# Patient Record
Sex: FEMALE | Race: WHITE | NOT HISPANIC OR LATINO | Employment: OTHER | URBAN - METROPOLITAN AREA
[De-identification: names, ages, dates, MRNs, and addresses within clinical notes are randomized per-mention and may not be internally consistent; named-entity substitution may affect disease eponyms.]

---

## 2021-06-22 DIAGNOSIS — Z11.59 ENCOUNTER FOR SCREENING FOR OTHER VIRAL DISEASES: ICD-10-CM

## 2021-06-30 ENCOUNTER — MEDICAL CORRESPONDENCE (OUTPATIENT)
Dept: HEALTH INFORMATION MANAGEMENT | Facility: CLINIC | Age: 76
End: 2021-06-30

## 2021-08-03 ENCOUNTER — HOSPITAL ENCOUNTER (OUTPATIENT)
Dept: WOUND CARE | Facility: CLINIC | Age: 76
End: 2021-08-03
Attending: UROLOGY
Payer: MEDICARE

## 2021-08-03 PROCEDURE — 999N000196 HC STATISTIC WOC PT EDUCATION, > 60 MIN

## 2021-08-03 NOTE — PROGRESS NOTES
"[facility] Nurse Ostomy Marking and Education         Data:     History:    History of bladder CA. Pt lost  1 year ago this August. Pt upset she will be in hospital on Anniversary of husbands passing. Pt has no children.       Pt referred by Dr. Cai     Who is present for marking and education: Pt and a friend    Type of ostomy surgery:  Cystectomy, hysterectomy, Bilaterla S&O with Ileal conduit    Abdomen:  Rounded and soft. Pt has deep crease @ level of umbilicus.  Small rounded lower panus that fall slightly when pt stands. Superior panus noted and rolls into umbilical area crease. No old scars noted.            Intervention:       Patient's chart evaluated.      Assessments done today:  Belt line, previous abdominal surgeries and scars, and abdominal muscles.  Pt observed lying, sitting and standing.     Education:  Reviewed upcoming surgery, stoma construction, post-op expectations, and general Ileal conduit  Cares/concerns including: diet, bathing, odor, expected emptying, activity, clothing, night drainage; supplies     All patien questions answered.    Patient marked with \"x\" using surgical marker then allowed to dry 3 minutes and sealed with 3M Cavilon No Sting barrier film.  Pt marked apex of small small abdominal bulge that allows for adequate visualization and surrounding pouching surface           Assessment:      Pt is ready for OR          Plan:       Plan:   ? Surgery scheduled for:  8-11-21 @ 8am  ? Education and sample appliance given to pt for review.   ? Pt has [facility] contact information and will call for any ?/concerns    Will plan to follow patient post operatively     Margarita Maya [facility] RN        "

## 2021-08-05 RX ORDER — CEFAZOLIN SODIUM 2 G/100ML
2 INJECTION, SOLUTION INTRAVENOUS
Status: CANCELLED | OUTPATIENT
Start: 2021-08-11

## 2021-08-09 ENCOUNTER — LAB (OUTPATIENT)
Dept: URGENT CARE | Facility: URGENT CARE | Age: 76
End: 2021-08-09
Attending: UROLOGY
Payer: MEDICARE

## 2021-08-09 DIAGNOSIS — Z11.59 ENCOUNTER FOR SCREENING FOR OTHER VIRAL DISEASES: ICD-10-CM

## 2021-08-09 LAB — SARS-COV-2 RNA RESP QL NAA+PROBE: NEGATIVE

## 2021-08-09 PROCEDURE — U0005 INFEC AGEN DETEC AMPLI PROBE: HCPCS

## 2021-08-09 PROCEDURE — U0003 INFECTIOUS AGENT DETECTION BY NUCLEIC ACID (DNA OR RNA); SEVERE ACUTE RESPIRATORY SYNDROME CORONAVIRUS 2 (SARS-COV-2) (CORONAVIRUS DISEASE [COVID-19]), AMPLIFIED PROBE TECHNIQUE, MAKING USE OF HIGH THROUGHPUT TECHNOLOGIES AS DESCRIBED BY CMS-2020-01-R: HCPCS

## 2021-08-10 ENCOUNTER — ANESTHESIA EVENT (OUTPATIENT)
Dept: SURGERY | Facility: CLINIC | Age: 76
DRG: 654 | End: 2021-08-10
Payer: MEDICARE

## 2021-08-10 RX ORDER — MULTIPLE VITAMINS W/ MINERALS TAB 9MG-400MCG
1 TAB ORAL EVERY MORNING
COMMUNITY

## 2021-08-10 RX ORDER — BRIMONIDINE TARTRATE 1.5 MG/ML
1 SOLUTION/ DROPS OPHTHALMIC EVERY MORNING
COMMUNITY

## 2021-08-10 RX ORDER — LATANOPROST 50 UG/ML
1 SOLUTION/ DROPS OPHTHALMIC EVERY EVENING
COMMUNITY

## 2021-08-10 RX ORDER — CARBOXYMETHYLCELLULOSE SODIUM 5 MG/ML
1 SOLUTION/ DROPS OPHTHALMIC 3 TIMES DAILY PRN
COMMUNITY

## 2021-08-10 ASSESSMENT — LIFESTYLE VARIABLES: TOBACCO_USE: 1

## 2021-08-10 ASSESSMENT — COPD QUESTIONNAIRES
CAT_SEVERITY: MILD
COPD: 1

## 2021-08-10 NOTE — PROGRESS NOTES
"PTA medications updated by Medication Scribe prior to surgery via phone call with patient (last doses completed by Nurse)     Medication history sources: Patient, Surescripts, H&P and Patient's home med list  In the past week, patient estimated taking medication this percent of the time: Greater than 90%  Adherence assessment: N/A Not Observed    Significant changes made to the medication list:  None      Additional medication history information:   Patient brought own home meds: Latanoprost eye drsop, Brimonidine eye drops, Refresh eye drops    Medication reconciliation completed by provider prior to medication history? No    Time spent in this activity: 20 minutes    The information provided in this note is only as accurate as the sources available at the time of update(s)      Prior to Admission medications    Medication Sig Last Dose Taking? Auth Provider   brimonidine (ALPHAGAN-P) 0.15 % ophthalmic solution Place 1 drop into both eyes every morning 8/10/2021 at am Yes Reported, Patient   Calcium-Magnesium-Vitamin D (CALCIUM MAGNESIUM PO) Take 3-4 tablets by mouth 2 times daily \"Bone Health Packets\" > 1 week Yes Reported, Patient   carboxymethylcellulose PF (REFRESH PLUS) 0.5 % ophthalmic solution Place 1 drop into both eyes 3 times daily as needed for dry eyes  at prn Yes Reported, Patient   Fiber POWD Take 1 Dose by mouth daily Mixed with water > 1 week Yes Reported, Patient   latanoprost (XALATAN) 0.005 % ophthalmic solution Place 1 drop into both eyes every evening  at pm Yes Reported, Patient   Multiple Vitamins-Minerals (ZINC PO) Take 1 tablet by mouth every morning \"Zinc + Holy Basil\" > 1 week Yes Reported, Patient   multivitamin w/minerals (THERA-VIT-M) tablet Take 1 tablet by mouth every morning > 1 week Yes Reported, Patient   Probiotic Product (PROBIOTIC PO) Take 1 Dose by mouth daily Mixed in water > 1 week Yes Reported, Patient   TURMERIC PO Take 1 tablet by mouth every evening > 1 week Yes " Reported, Patient

## 2021-08-11 ENCOUNTER — HOSPITAL ENCOUNTER (INPATIENT)
Facility: CLINIC | Age: 76
LOS: 8 days | Discharge: NURSING, REHAB OR LONG-TERM CARE | DRG: 654 | End: 2021-08-19
Attending: UROLOGY | Admitting: UROLOGY
Payer: MEDICARE

## 2021-08-11 ENCOUNTER — ANESTHESIA (OUTPATIENT)
Dept: SURGERY | Facility: CLINIC | Age: 76
DRG: 654 | End: 2021-08-11
Payer: MEDICARE

## 2021-08-11 DIAGNOSIS — C67.9 MALIGNANT NEOPLASM OF URINARY BLADDER, UNSPECIFIED SITE (H): Primary | ICD-10-CM

## 2021-08-11 DIAGNOSIS — I10 BENIGN ESSENTIAL HYPERTENSION: ICD-10-CM

## 2021-08-11 LAB
ABO/RH(D): NORMAL
ANTIBODY SCREEN: NEGATIVE
HGB BLD-MCNC: 13.9 G/DL (ref 11.7–15.7)
POTASSIUM BLD-SCNC: 4.5 MMOL/L (ref 3.4–5.3)
SPECIMEN EXPIRATION DATE: NORMAL

## 2021-08-11 PROCEDURE — 250N000025 HC SEVOFLURANE, PER MIN: Performed by: UROLOGY

## 2021-08-11 PROCEDURE — 250N000009 HC RX 250: Performed by: NURSE ANESTHETIST, CERTIFIED REGISTERED

## 2021-08-11 PROCEDURE — 360N000078 HC SURGERY LEVEL 5, PER MIN: Performed by: UROLOGY

## 2021-08-11 PROCEDURE — 258N000003 HC RX IP 258 OP 636: Performed by: NURSE ANESTHETIST, CERTIFIED REGISTERED

## 2021-08-11 PROCEDURE — 258N000003 HC RX IP 258 OP 636: Performed by: ANESTHESIOLOGY

## 2021-08-11 PROCEDURE — 710N000009 HC RECOVERY PHASE 1, LEVEL 1, PER MIN: Performed by: UROLOGY

## 2021-08-11 PROCEDURE — 258N000003 HC RX IP 258 OP 636: Performed by: PHYSICIAN ASSISTANT

## 2021-08-11 PROCEDURE — 0TB60ZX EXCISION OF RIGHT URETER, OPEN APPROACH, DIAGNOSTIC: ICD-10-PCS | Performed by: UROLOGY

## 2021-08-11 PROCEDURE — 250N000011 HC RX IP 250 OP 636: Performed by: NURSE ANESTHETIST, CERTIFIED REGISTERED

## 2021-08-11 PROCEDURE — 0UT70ZZ RESECTION OF BILATERAL FALLOPIAN TUBES, OPEN APPROACH: ICD-10-PCS | Performed by: UROLOGY

## 2021-08-11 PROCEDURE — 07BC0ZZ EXCISION OF PELVIS LYMPHATIC, OPEN APPROACH: ICD-10-PCS | Performed by: UROLOGY

## 2021-08-11 PROCEDURE — 250N000011 HC RX IP 250 OP 636: Performed by: ANESTHESIOLOGY

## 2021-08-11 PROCEDURE — 36415 COLL VENOUS BLD VENIPUNCTURE: CPT | Performed by: ANESTHESIOLOGY

## 2021-08-11 PROCEDURE — 250N000011 HC RX IP 250 OP 636: Performed by: PHYSICIAN ASSISTANT

## 2021-08-11 PROCEDURE — 0TB70ZX EXCISION OF LEFT URETER, OPEN APPROACH, DIAGNOSTIC: ICD-10-PCS | Performed by: UROLOGY

## 2021-08-11 PROCEDURE — 0UT20ZZ RESECTION OF BILATERAL OVARIES, OPEN APPROACH: ICD-10-PCS | Performed by: UROLOGY

## 2021-08-11 PROCEDURE — 0TTB0ZZ RESECTION OF BLADDER, OPEN APPROACH: ICD-10-PCS | Performed by: UROLOGY

## 2021-08-11 PROCEDURE — 88331 PATH CONSLTJ SURG 1 BLK 1SPC: CPT | Mod: TC | Performed by: UROLOGY

## 2021-08-11 PROCEDURE — 120N000001 HC R&B MED SURG/OB

## 2021-08-11 PROCEDURE — 272N000001 HC OR GENERAL SUPPLY STERILE: Performed by: UROLOGY

## 2021-08-11 PROCEDURE — 370N000017 HC ANESTHESIA TECHNICAL FEE, PER MIN: Performed by: UROLOGY

## 2021-08-11 PROCEDURE — P9041 ALBUMIN (HUMAN),5%, 50ML: HCPCS | Performed by: NURSE ANESTHETIST, CERTIFIED REGISTERED

## 2021-08-11 PROCEDURE — 84132 ASSAY OF SERUM POTASSIUM: CPT | Performed by: ANESTHESIOLOGY

## 2021-08-11 PROCEDURE — 250N000013 HC RX MED GY IP 250 OP 250 PS 637: Performed by: PHYSICIAN ASSISTANT

## 2021-08-11 PROCEDURE — 0DTJ0ZZ RESECTION OF APPENDIX, OPEN APPROACH: ICD-10-PCS | Performed by: UROLOGY

## 2021-08-11 PROCEDURE — 85018 HEMOGLOBIN: CPT | Performed by: ANESTHESIOLOGY

## 2021-08-11 PROCEDURE — 86900 BLOOD TYPING SEROLOGIC ABO: CPT | Performed by: PHYSICIAN ASSISTANT

## 2021-08-11 PROCEDURE — 0T1807C BYPASS BILATERAL URETERS TO ILEOCUTANEOUS WITH AUTOLOGOUS TISSUE SUBSTITUTE, OPEN APPROACH: ICD-10-PCS | Performed by: UROLOGY

## 2021-08-11 PROCEDURE — 999N000141 HC STATISTIC PRE-PROCEDURE NURSING ASSESSMENT: Performed by: UROLOGY

## 2021-08-11 PROCEDURE — 0TTD0ZZ RESECTION OF URETHRA, OPEN APPROACH: ICD-10-PCS | Performed by: UROLOGY

## 2021-08-11 PROCEDURE — 0UT90ZZ RESECTION OF UTERUS, OPEN APPROACH: ICD-10-PCS | Performed by: UROLOGY

## 2021-08-11 RX ORDER — PROPOFOL 10 MG/ML
INJECTION, EMULSION INTRAVENOUS CONTINUOUS PRN
Status: DISCONTINUED | OUTPATIENT
Start: 2021-08-11 | End: 2021-08-11

## 2021-08-11 RX ORDER — ACETAMINOPHEN 325 MG/1
975 TABLET ORAL EVERY 6 HOURS
Status: DISCONTINUED | OUTPATIENT
Start: 2021-08-11 | End: 2021-08-19 | Stop reason: HOSPADM

## 2021-08-11 RX ORDER — PROCHLORPERAZINE MALEATE 5 MG
5 TABLET ORAL EVERY 6 HOURS PRN
Status: DISCONTINUED | OUTPATIENT
Start: 2021-08-11 | End: 2021-08-19 | Stop reason: HOSPADM

## 2021-08-11 RX ORDER — ALVIMOPAN 12 MG/1
12 CAPSULE ORAL 2 TIMES DAILY
Status: DISCONTINUED | OUTPATIENT
Start: 2021-08-12 | End: 2021-08-15

## 2021-08-11 RX ORDER — SODIUM CHLORIDE, SODIUM LACTATE, POTASSIUM CHLORIDE, CALCIUM CHLORIDE 600; 310; 30; 20 MG/100ML; MG/100ML; MG/100ML; MG/100ML
INJECTION, SOLUTION INTRAVENOUS CONTINUOUS
Status: DISCONTINUED | OUTPATIENT
Start: 2021-08-11 | End: 2021-08-11 | Stop reason: HOSPADM

## 2021-08-11 RX ORDER — NALOXONE HYDROCHLORIDE 0.4 MG/ML
0.4 INJECTION, SOLUTION INTRAMUSCULAR; INTRAVENOUS; SUBCUTANEOUS
Status: DISCONTINUED | OUTPATIENT
Start: 2021-08-11 | End: 2021-08-19 | Stop reason: HOSPADM

## 2021-08-11 RX ORDER — SODIUM CHLORIDE, SODIUM LACTATE, POTASSIUM CHLORIDE, CALCIUM CHLORIDE 600; 310; 30; 20 MG/100ML; MG/100ML; MG/100ML; MG/100ML
INJECTION, SOLUTION INTRAVENOUS CONTINUOUS PRN
Status: DISCONTINUED | OUTPATIENT
Start: 2021-08-11 | End: 2021-08-11

## 2021-08-11 RX ORDER — FENTANYL CITRATE 0.05 MG/ML
50 INJECTION, SOLUTION INTRAMUSCULAR; INTRAVENOUS
Status: COMPLETED | OUTPATIENT
Start: 2021-08-11 | End: 2021-08-11

## 2021-08-11 RX ORDER — ONDANSETRON 2 MG/ML
INJECTION INTRAMUSCULAR; INTRAVENOUS PRN
Status: DISCONTINUED | OUTPATIENT
Start: 2021-08-11 | End: 2021-08-11

## 2021-08-11 RX ORDER — CARBOXYMETHYLCELLULOSE SODIUM 5 MG/ML
1 SOLUTION/ DROPS OPHTHALMIC 3 TIMES DAILY PRN
Status: DISCONTINUED | OUTPATIENT
Start: 2021-08-11 | End: 2021-08-19 | Stop reason: HOSPADM

## 2021-08-11 RX ORDER — DIPHENHYDRAMINE HCL 12.5MG/5ML
12.5 LIQUID (ML) ORAL EVERY 6 HOURS PRN
Status: DISCONTINUED | OUTPATIENT
Start: 2021-08-11 | End: 2021-08-19 | Stop reason: HOSPADM

## 2021-08-11 RX ORDER — OXYCODONE HYDROCHLORIDE 5 MG/1
5 TABLET ORAL EVERY 4 HOURS PRN
Status: DISCONTINUED | OUTPATIENT
Start: 2021-08-11 | End: 2021-08-15

## 2021-08-11 RX ORDER — HYDROMORPHONE HCL IN WATER/PF 6 MG/30 ML
.2-.4 PATIENT CONTROLLED ANALGESIA SYRINGE INTRAVENOUS EVERY 5 MIN PRN
Status: DISCONTINUED | OUTPATIENT
Start: 2021-08-11 | End: 2021-08-11 | Stop reason: HOSPADM

## 2021-08-11 RX ORDER — ALBUMIN HUMAN 5 %
INTRAVENOUS SOLUTION INTRAVENOUS PRN
Status: DISCONTINUED | OUTPATIENT
Start: 2021-08-11 | End: 2021-08-11

## 2021-08-11 RX ORDER — NALOXONE HYDROCHLORIDE 0.4 MG/ML
0.2 INJECTION, SOLUTION INTRAMUSCULAR; INTRAVENOUS; SUBCUTANEOUS
Status: DISCONTINUED | OUTPATIENT
Start: 2021-08-11 | End: 2021-08-19 | Stop reason: HOSPADM

## 2021-08-11 RX ORDER — ONDANSETRON 4 MG/1
4 TABLET, ORALLY DISINTEGRATING ORAL EVERY 30 MIN PRN
Status: DISCONTINUED | OUTPATIENT
Start: 2021-08-11 | End: 2021-08-11 | Stop reason: HOSPADM

## 2021-08-11 RX ORDER — FENTANYL CITRATE 50 UG/ML
INJECTION, SOLUTION INTRAMUSCULAR; INTRAVENOUS PRN
Status: DISCONTINUED | OUTPATIENT
Start: 2021-08-11 | End: 2021-08-11

## 2021-08-11 RX ORDER — BRIMONIDINE TARTRATE 1.5 MG/ML
1 SOLUTION/ DROPS OPHTHALMIC EVERY MORNING
Status: DISCONTINUED | OUTPATIENT
Start: 2021-08-12 | End: 2021-08-19 | Stop reason: HOSPADM

## 2021-08-11 RX ORDER — CEFAZOLIN SODIUM 1 G/3ML
1 INJECTION, POWDER, FOR SOLUTION INTRAMUSCULAR; INTRAVENOUS EVERY 8 HOURS
Status: COMPLETED | OUTPATIENT
Start: 2021-08-11 | End: 2021-08-13

## 2021-08-11 RX ORDER — NEOSTIGMINE METHYLSULFATE 1 MG/ML
VIAL (ML) INJECTION PRN
Status: DISCONTINUED | OUTPATIENT
Start: 2021-08-11 | End: 2021-08-11

## 2021-08-11 RX ORDER — LIDOCAINE HYDROCHLORIDE 20 MG/ML
INJECTION, SOLUTION INFILTRATION; PERINEURAL PRN
Status: DISCONTINUED | OUTPATIENT
Start: 2021-08-11 | End: 2021-08-11

## 2021-08-11 RX ORDER — PROPOFOL 10 MG/ML
INJECTION, EMULSION INTRAVENOUS PRN
Status: DISCONTINUED | OUTPATIENT
Start: 2021-08-11 | End: 2021-08-11

## 2021-08-11 RX ORDER — EPHEDRINE SULFATE 50 MG/ML
INJECTION, SOLUTION INTRAMUSCULAR; INTRAVENOUS; SUBCUTANEOUS PRN
Status: DISCONTINUED | OUTPATIENT
Start: 2021-08-11 | End: 2021-08-11

## 2021-08-11 RX ORDER — SODIUM CHLORIDE 9 MG/ML
INJECTION, SOLUTION INTRAVENOUS CONTINUOUS
Status: DISCONTINUED | OUTPATIENT
Start: 2021-08-11 | End: 2021-08-13

## 2021-08-11 RX ORDER — ONDANSETRON 4 MG/1
4 TABLET, ORALLY DISINTEGRATING ORAL EVERY 6 HOURS PRN
Status: DISCONTINUED | OUTPATIENT
Start: 2021-08-11 | End: 2021-08-19 | Stop reason: HOSPADM

## 2021-08-11 RX ORDER — DIPHENHYDRAMINE HYDROCHLORIDE 50 MG/ML
12.5 INJECTION INTRAMUSCULAR; INTRAVENOUS EVERY 6 HOURS PRN
Status: DISCONTINUED | OUTPATIENT
Start: 2021-08-11 | End: 2021-08-19 | Stop reason: HOSPADM

## 2021-08-11 RX ORDER — METOCLOPRAMIDE HYDROCHLORIDE 5 MG/ML
10 INJECTION INTRAMUSCULAR; INTRAVENOUS EVERY 6 HOURS
Status: DISCONTINUED | OUTPATIENT
Start: 2021-08-11 | End: 2021-08-19 | Stop reason: HOSPADM

## 2021-08-11 RX ORDER — NALBUPHINE HYDROCHLORIDE 10 MG/ML
2.5-5 INJECTION, SOLUTION INTRAMUSCULAR; INTRAVENOUS; SUBCUTANEOUS EVERY 6 HOURS PRN
Status: DISCONTINUED | OUTPATIENT
Start: 2021-08-11 | End: 2021-08-19 | Stop reason: HOSPADM

## 2021-08-11 RX ORDER — LIDOCAINE 40 MG/G
CREAM TOPICAL
Status: DISCONTINUED | OUTPATIENT
Start: 2021-08-11 | End: 2021-08-19 | Stop reason: HOSPADM

## 2021-08-11 RX ORDER — ALBUTEROL SULFATE 0.83 MG/ML
2.5 SOLUTION RESPIRATORY (INHALATION) EVERY 4 HOURS PRN
Status: DISCONTINUED | OUTPATIENT
Start: 2021-08-11 | End: 2021-08-11 | Stop reason: HOSPADM

## 2021-08-11 RX ORDER — ONDANSETRON 2 MG/ML
4 INJECTION INTRAMUSCULAR; INTRAVENOUS EVERY 30 MIN PRN
Status: DISCONTINUED | OUTPATIENT
Start: 2021-08-11 | End: 2021-08-11 | Stop reason: HOSPADM

## 2021-08-11 RX ORDER — LATANOPROST 50 UG/ML
1 SOLUTION/ DROPS OPHTHALMIC EVERY EVENING
Status: DISCONTINUED | OUTPATIENT
Start: 2021-08-11 | End: 2021-08-19 | Stop reason: HOSPADM

## 2021-08-11 RX ORDER — MEPERIDINE HYDROCHLORIDE 25 MG/ML
12.5 INJECTION INTRAMUSCULAR; INTRAVENOUS; SUBCUTANEOUS EVERY 5 MIN PRN
Status: DISCONTINUED | OUTPATIENT
Start: 2021-08-11 | End: 2021-08-11 | Stop reason: HOSPADM

## 2021-08-11 RX ORDER — GLYCOPYRROLATE 0.2 MG/ML
INJECTION, SOLUTION INTRAMUSCULAR; INTRAVENOUS PRN
Status: DISCONTINUED | OUTPATIENT
Start: 2021-08-11 | End: 2021-08-11

## 2021-08-11 RX ORDER — DEXAMETHASONE SODIUM PHOSPHATE 4 MG/ML
INJECTION, SOLUTION INTRA-ARTICULAR; INTRALESIONAL; INTRAMUSCULAR; INTRAVENOUS; SOFT TISSUE PRN
Status: DISCONTINUED | OUTPATIENT
Start: 2021-08-11 | End: 2021-08-11

## 2021-08-11 RX ORDER — FENTANYL CITRATE 0.05 MG/ML
25 INJECTION, SOLUTION INTRAMUSCULAR; INTRAVENOUS EVERY 5 MIN PRN
Status: DISCONTINUED | OUTPATIENT
Start: 2021-08-11 | End: 2021-08-11 | Stop reason: HOSPADM

## 2021-08-11 RX ORDER — CEFOXITIN 2 G/1
2 INJECTION, POWDER, FOR SOLUTION INTRAVENOUS
Status: COMPLETED | OUTPATIENT
Start: 2021-08-11 | End: 2021-08-11

## 2021-08-11 RX ORDER — BUPIVACAINE HYDROCHLORIDE AND EPINEPHRINE 2.5; 5 MG/ML; UG/ML
INJECTION, SOLUTION INFILTRATION; PERINEURAL PRN
Status: DISCONTINUED | OUTPATIENT
Start: 2021-08-11 | End: 2021-08-11

## 2021-08-11 RX ORDER — ALVIMOPAN 12 MG/1
12 CAPSULE ORAL ONCE
Status: COMPLETED | OUTPATIENT
Start: 2021-08-11 | End: 2021-08-11

## 2021-08-11 RX ORDER — ONDANSETRON 2 MG/ML
4 INJECTION INTRAMUSCULAR; INTRAVENOUS EVERY 6 HOURS PRN
Status: DISCONTINUED | OUTPATIENT
Start: 2021-08-11 | End: 2021-08-19 | Stop reason: HOSPADM

## 2021-08-11 RX ADMIN — METOCLOPRAMIDE HYDROCHLORIDE 10 MG: 5 INJECTION INTRAMUSCULAR; INTRAVENOUS at 23:31

## 2021-08-11 RX ADMIN — ALVIMOPAN 12 MG: 12 CAPSULE ORAL at 06:13

## 2021-08-11 RX ADMIN — NEOSTIGMINE METHYLSULFATE 4 MG: 1 INJECTION, SOLUTION INTRAVENOUS at 12:30

## 2021-08-11 RX ADMIN — ROCURONIUM BROMIDE 50 MG: 10 INJECTION INTRAVENOUS at 07:50

## 2021-08-11 RX ADMIN — CEFOXITIN SODIUM 2 G: 2 POWDER, FOR SOLUTION INTRAVENOUS at 11:41

## 2021-08-11 RX ADMIN — SODIUM CHLORIDE, POTASSIUM CHLORIDE, SODIUM LACTATE AND CALCIUM CHLORIDE: 600; 310; 30; 20 INJECTION, SOLUTION INTRAVENOUS at 10:31

## 2021-08-11 RX ADMIN — Medication 12 MCG: at 07:45

## 2021-08-11 RX ADMIN — ROCURONIUM BROMIDE 20 MG: 10 INJECTION INTRAVENOUS at 08:41

## 2021-08-11 RX ADMIN — GLYCOPYRROLATE 0.2 MG: 0.2 INJECTION, SOLUTION INTRAMUSCULAR; INTRAVENOUS at 08:39

## 2021-08-11 RX ADMIN — PHENYLEPHRINE HYDROCHLORIDE 100 MCG: 10 INJECTION INTRAVENOUS at 10:31

## 2021-08-11 RX ADMIN — FENTANYL CITRATE 50 MCG: 50 INJECTION, SOLUTION INTRAMUSCULAR; INTRAVENOUS at 07:55

## 2021-08-11 RX ADMIN — ROCURONIUM BROMIDE 10 MG: 10 INJECTION INTRAVENOUS at 10:05

## 2021-08-11 RX ADMIN — SODIUM CHLORIDE: 9 INJECTION, SOLUTION INTRAVENOUS at 16:14

## 2021-08-11 RX ADMIN — SODIUM CHLORIDE, POTASSIUM CHLORIDE, SODIUM LACTATE AND CALCIUM CHLORIDE: 600; 310; 30; 20 INJECTION, SOLUTION INTRAVENOUS at 08:00

## 2021-08-11 RX ADMIN — ROCURONIUM BROMIDE 20 MG: 10 INJECTION INTRAVENOUS at 10:39

## 2021-08-11 RX ADMIN — FENTANYL CITRATE 50 MCG: 50 INJECTION, SOLUTION INTRAMUSCULAR; INTRAVENOUS at 07:27

## 2021-08-11 RX ADMIN — METOCLOPRAMIDE HYDROCHLORIDE 10 MG: 5 INJECTION INTRAMUSCULAR; INTRAVENOUS at 18:17

## 2021-08-11 RX ADMIN — CEFAZOLIN 1 G: 1 INJECTION, POWDER, FOR SOLUTION INTRAMUSCULAR; INTRAVENOUS at 19:33

## 2021-08-11 RX ADMIN — Medication 5 MG: at 08:40

## 2021-08-11 RX ADMIN — Medication 250 ML: at 11:06

## 2021-08-11 RX ADMIN — BUPIVACAINE HYDROCHLORIDE AND EPINEPHRINE BITARTRATE 3 ML: 2.5; .005 INJECTION, SOLUTION EPIDURAL; INFILTRATION; INTRACAUDAL; PERINEURAL at 10:58

## 2021-08-11 RX ADMIN — DEXAMETHASONE SODIUM PHOSPHATE 4 MG: 4 INJECTION, SOLUTION INTRA-ARTICULAR; INTRALESIONAL; INTRAMUSCULAR; INTRAVENOUS; SOFT TISSUE at 07:50

## 2021-08-11 RX ADMIN — ONDANSETRON 4 MG: 2 INJECTION INTRAMUSCULAR; INTRAVENOUS at 12:16

## 2021-08-11 RX ADMIN — PROPOFOL 150 MG: 10 INJECTION, EMULSION INTRAVENOUS at 07:50

## 2021-08-11 RX ADMIN — HYDROMORPHONE HYDROCHLORIDE 0.5 MG: 1 INJECTION, SOLUTION INTRAMUSCULAR; INTRAVENOUS; SUBCUTANEOUS at 09:19

## 2021-08-11 RX ADMIN — ROCURONIUM BROMIDE 30 MG: 10 INJECTION INTRAVENOUS at 08:31

## 2021-08-11 RX ADMIN — PHENYLEPHRINE HYDROCHLORIDE 100 MCG: 10 INJECTION INTRAVENOUS at 08:20

## 2021-08-11 RX ADMIN — Medication 8 MCG: at 09:07

## 2021-08-11 RX ADMIN — CEFOXITIN SODIUM 2 G: 2 POWDER, FOR SOLUTION INTRAVENOUS at 09:48

## 2021-08-11 RX ADMIN — PHENYLEPHRINE HYDROCHLORIDE 150 MCG: 10 INJECTION INTRAVENOUS at 08:04

## 2021-08-11 RX ADMIN — ROCURONIUM BROMIDE 20 MG: 10 INJECTION INTRAVENOUS at 09:32

## 2021-08-11 RX ADMIN — CEFOXITIN SODIUM 2 G: 2 POWDER, FOR SOLUTION INTRAVENOUS at 07:55

## 2021-08-11 RX ADMIN — ROCURONIUM BROMIDE 5 MG: 10 INJECTION INTRAVENOUS at 12:04

## 2021-08-11 RX ADMIN — FENTANYL CITRATE: 0.05 INJECTION, SOLUTION INTRAMUSCULAR; INTRAVENOUS at 13:46

## 2021-08-11 RX ADMIN — PROPOFOL 30 MCG/KG/MIN: 10 INJECTION, EMULSION INTRAVENOUS at 07:55

## 2021-08-11 RX ADMIN — Medication 250 ML: at 10:11

## 2021-08-11 RX ADMIN — SODIUM CHLORIDE, POTASSIUM CHLORIDE, SODIUM LACTATE AND CALCIUM CHLORIDE: 600; 310; 30; 20 INJECTION, SOLUTION INTRAVENOUS at 07:37

## 2021-08-11 RX ADMIN — ROCURONIUM BROMIDE 10 MG: 10 INJECTION INTRAVENOUS at 11:19

## 2021-08-11 RX ADMIN — PHENYLEPHRINE HYDROCHLORIDE 100 MCG: 10 INJECTION INTRAVENOUS at 08:38

## 2021-08-11 RX ADMIN — LIDOCAINE HYDROCHLORIDE 70 MG: 20 INJECTION, SOLUTION INFILTRATION; PERINEURAL at 07:50

## 2021-08-11 RX ADMIN — BUPIVACAINE HYDROCHLORIDE AND EPINEPHRINE BITARTRATE 3 ML: 2.5; .005 INJECTION, SOLUTION EPIDURAL; INFILTRATION; INTRACAUDAL; PERINEURAL at 11:48

## 2021-08-11 RX ADMIN — MIDAZOLAM HYDROCHLORIDE 1 MG: 1 INJECTION, SOLUTION INTRAMUSCULAR; INTRAVENOUS at 07:26

## 2021-08-11 RX ADMIN — FENTANYL CITRATE 50 MCG: 50 INJECTION, SOLUTION INTRAMUSCULAR; INTRAVENOUS at 07:50

## 2021-08-11 RX ADMIN — PHENYLEPHRINE HYDROCHLORIDE 50 MCG: 10 INJECTION INTRAVENOUS at 08:05

## 2021-08-11 RX ADMIN — PHENYLEPHRINE HYDROCHLORIDE 0.25 MCG/KG/MIN: 10 INJECTION INTRAVENOUS at 08:24

## 2021-08-11 RX ADMIN — ONDANSETRON 4 MG: 2 INJECTION INTRAMUSCULAR; INTRAVENOUS at 13:05

## 2021-08-11 RX ADMIN — GLYCOPYRROLATE 0.6 MG: 0.2 INJECTION, SOLUTION INTRAMUSCULAR; INTRAVENOUS at 12:30

## 2021-08-11 RX ADMIN — LATANOPROST 1 DROP: 50 SOLUTION/ DROPS OPHTHALMIC at 20:43

## 2021-08-11 RX ADMIN — BUPIVACAINE HYDROCHLORIDE AND EPINEPHRINE BITARTRATE 2 ML: 2.5; .005 INJECTION, SOLUTION EPIDURAL; INFILTRATION; INTRACAUDAL; PERINEURAL at 12:52

## 2021-08-11 ASSESSMENT — ACTIVITIES OF DAILY LIVING (ADL)
ADLS_ACUITY_SCORE: 21
ADLS_ACUITY_SCORE: 15

## 2021-08-11 ASSESSMENT — ENCOUNTER SYMPTOMS
DYSRHYTHMIAS: 0
SEIZURES: 0

## 2021-08-11 ASSESSMENT — MIFFLIN-ST. JEOR: SCORE: 1265.6

## 2021-08-11 NOTE — PROCEDURES
OPERATIVE REPORT    SURGEON: Francis Cai MD   ASSISTANT: Leonela Acosta PA-C    PREOPERATIVE DIAGNOSIS: Muscle invasive bladder cancer.  POSTOPERATIVE DIAGNOSIS: Muscle invasive bladder cancer.     PROCEDURE PERFORMED:   1. Radical cystectomy  2. Hysterectomy with bilateral salpingooopherectomy   3. Bilateral pelvic lymphadenectomy  4. Creation of ileal conduit urinary diversion  5. Appendectomy       OPERATIVE INDICATION: Felicitas Victor is a 76 year old female who was 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 The patient did not receive neoadjuvant chemotherapy.  Staging was done with PET-CT  and was negative for metastatic disease.  After understanding various management options, she elected to undergo today's procedure described above.     Findings: No evidence of extravesical cancer. Firm bladder mass at dome.  Bladder mobile. Lymph nodes not enlarged.     DESCRIPTION OF PROCEDURE : After properly identifying the patient and verifying informed consent, she was brought to the operating room in stable condition. After sufficient induction of general anesthetic, she was placed in a frog-legged supine position with all pressure points well padded. She was then prepped and draped in the usual manner. A Govea catheter was placed within the sterile field.  A betadine soaked sponge stick was placed in the vagina.    A midline incision was made from the umbilicus to the symphysis pubis. The incision was carried thru the layers and the abdominal cavity was entered. Retraction was maintained using the Bookwalter retractor. The dissection was started on the right side.  I then identified the right ovarian vessels and the right ureter. The ovarian vessels were doubly clamped and transected. The right ureter was than dissected all the way to the bladder where it was divided between clips.  The broad ligament was incised and the round ligament was cauterized and transected. The ovary and the fallopian  tube were freed. The bladder was than dissected away from the right pelvic wall all the way to the endopelvic fascia. The same procedure was than performed on the left. The peritoneum at the cul-de-sac between the bladder and the uterus was incised.  This exposed the lateral bladder pedicles. Now using the Ligasure the bladder pedicles were cauterized and transected. Posteriorly the anterior vaginal wall was opened and incorporated along with the trigone. The bladder was dissected all the way to the urethra. Next I mobilized the bladder anteriorly.  The urethra was identified and dissected. I incised the anterior vaginal wall circumferentially to complete the hysterectomy and the uterus, bilateral ovaries and tubes, bladder, and cuff of anterior vaginal wall and the urethra was completely freed up and sent to pathology.  The urethra was transected at the meatus and the distal opening closed with a running suture of 2-0 vicryl. Frozen section from the distal urethral margin was sent.  Both the distal ureteral margins and the urethral margin returned negative for malignancy.     The vaginal wall was closed in 2 layers with 2-0 vicryl suture.         Pelvic Lymphadenectomy:  A bilateral pelvic lymphadenectomy was then performed. The lymphatic tissue around the external iliac vessels, obturator fossa and internal iliac vessels was taken to the common iliac bilaterally. The lateral margin of the dissection was the genitofemoral nerve. The obturator nerve was identified and preserved bilaterally.     Appendectomy: The appendix was next identified. It was dissected and appendectomy performed by placing NISHI stapler at the base of the appendix. This was sent to pathology for routine analysis.      We then proceeded to create an ileal conduit. A 15-cm segment of distal ileum was identified on a broad mesenteric pedicle. Windows in the mesentery were created with cautery and divided with the Ligasure. This segment of ileum was  isolated with a NISHI 80 stapler. Bowel continuity was achieved by performing a side-to-side stapled anastomosis with the NISHI and TA staplers. The isolated bowel segment was irrigated free of bowel contents with antibiotic solution. The left ureter was gently mobilized and brought medially through a window in the sigmoid mesentery. The right ureter was also gently mobilized. Both ureters were anastomosed to the proximal end of the conduit after spatulating their distal ends. This was done with interrupted 4-0 Vicryl stitches. Both ureters were stented with an 8-[place] pediatric feeding tube, which was secured to the ureters with a 5-0 chromic stitch to prevent stent migration.     At the marked RUQ site, a 2-cm [place] of skin was excised, and a cruciate incision was made in the rectus fascia. A 2-0 Vicryl stitch was placed in the posterior fascia, and four 2-0 Vicryl stitches were placed in the anterior rectus fascia. The conduit was then gently brought to the ostomy opening. It was secured to the fascia with the preplaced Vicryl stitches.  The stoma was then everted and matured using 3-0 chromic stitches. A nice healthy lety bud stoma was obtained. The window in the small bowel mesentery was then closed with several 3-0 Vicryl stitches, and the peritoneum was closed over the conduit, effectively retroperitonealizing the conduit.    The wound was irrigated and inspected, and hemostasis was found to be excellent. Rosita powder was applied to enhance hemostasis. The abdomen was then closed with a running looped #1 looped PDS reinfoirced with several figure-of-eight PDS stitches. A 15-[place] round Curry-Frazier drain was placed through the left lower quadrant to drain the pelvis and a second drain in the RLQ was anterior to the fascia to drain the subcutaneous space . These were secured to the skin with a drain stitch. The skin was closed with staples.  EBL was 150 mL. There were no apparent complications. All needle,  sponge, lap and instrument counts were correct. At this point, the patient was awoken from anesthesia and brought to recovery room in stable condition.

## 2021-08-11 NOTE — ANESTHESIA POSTPROCEDURE EVALUATION
Patient: Felicitas Victor    Procedure(s):  ANTERIOR PELVIC EXENTERATION, BILATERAL PELVIC LYMPH NODE DISSECTION  ILEAL CONDUIT URINARY DIVERSION,  APPENDECTOMY    Diagnosis:Bladder cancer (H) [C67.9]  Diagnosis Additional Information: No value filed.    Anesthesia Type:  General    Note:     Postop Pain Control: Uneventful            Sign Out: Well controlled pain   PONV: No   Neuro/Psych: Uneventful            Sign Out: Acceptable/Baseline neuro status   Airway/Respiratory: Uneventful            Sign Out: Acceptable/Baseline resp. status   CV/Hemodynamics: Uneventful            Sign Out: Acceptable CV status; No obvious hypovolemia; No obvious fluid overload   Other NRE: NONE   DID A NON-ROUTINE EVENT OCCUR? No           Last vitals:  Vitals Value Taken Time   BP 91/78 08/11/21 1559   Temp 36.8  C (98.3  F) 08/11/21 1540   Pulse 95 08/11/21 1559   Resp 17 08/11/21 1541   SpO2 98 % 08/11/21 1542   Vitals shown include unvalidated device data.    Electronically Signed By: Corbin Rodríguez MD  August 11, 2021  4:54 PM

## 2021-08-11 NOTE — ANESTHESIA CARE TRANSFER NOTE
Patient: Felicitas Victor    Procedure(s):  ANTERIOR PELVIC EXENTERATION, BILATERAL PELVIC LYMPH NODE DISSECTION  ILEAL CONDUIT URINARY DIVERSION,  APPENDECTOMY    Diagnosis: Bladder cancer (H) [C67.9]  Diagnosis Additional Information: No value filed.    Anesthesia Type:   General     Note:    Oropharynx: oropharynx clear of all foreign objects and spontaneously breathing  Level of Consciousness: awake and drowsy  Oxygen Supplementation: room air    Independent Airway: airway patency satisfactory and stable  Dentition: dentition unchanged      Patient transferred to: PACU  Comments: Neuromuscular blockade reversed after TOF 4/4, spontaneous respirations, adequate tidal volumes, followed commands to voice, oropharynx suctioned with soft flexible catheter, extubated atraumatically, extubated with suction, airway patent after extubation.  Oxygen via  at  to PACU. .     Handoff Report: Identifed the Patient, Identified the Reponsible Provider, Reviewed the pertinent medical history, Discussed the surgical course, Reviewed Intra-OP anesthesia mangement and issues during anesthesia, Set expectations for post-procedure period and Allowed opportunity for questions and acknowledgement of understanding      Vitals:  Vitals Value Taken Time   BP     Temp     Pulse 60 08/11/21 1323   Resp 19 08/11/21 1323   SpO2 95 % 08/11/21 1323   Vitals shown include unvalidated device data.    Electronically Signed By: KHADIJAH Tello CRNA  August 11, 2021  1:24 PM

## 2021-08-11 NOTE — ANESTHESIA PREPROCEDURE EVALUATION
Anesthesia Pre-Procedure Evaluation    Patient: Felicitas Victor   MRN: [id number] : 1945        Preoperative Diagnosis: Bladder cancer (H) [C67.9]   Procedure : Procedure(s):  ANTERIOR PELVIC EXENTERATION  ILEAL CONDUIT URINARY DIVERSION,  POSSIBLE APPENDECTOMY     Past Medical History:   Diagnosis Date     Astigmatism with presbyopia      Bilateral low back pain      Bladder mass      Cataract, nuclear sclerotic, both eyes      Diverticulosis      Endometrial hyperplasia      Hypertension      Mild emphysema (H)      Osteopenia      Primary open angle glaucoma      Pulmonary nodule       Past Surgical History:   Procedure Laterality Date     ENT SURGERY       GENITOURINARY SURGERY        No Known Allergies   Social History     Tobacco Use     Smoking status: Former Smoker   Substance Use Topics     Alcohol use: Not on file      Wt Readings from Last 1 Encounters:   No data found for Wt        Anesthesia Evaluation            ROS/MED HX  ENT/Pulmonary:     (+) tobacco use, Past use, mild,  COPD,  (-) asthma and sleep apnea   Neurologic:    (-) no seizures, no CVA and no TIA   Cardiovascular:     (+) hypertension-----Previous cardiac testing   Echo: Date: Results:    Stress Test: Date: Results:    ECG Reviewed: Date:  Results:  SB at 58 bpm  Cath: Date: Results:   (-) CAD, CHF and arrhythmias   METS/Exercise Tolerance: >4 METS    Hematologic:       Musculoskeletal:       GI/Hepatic:    (-) GERD and liver disease   Renal/Genitourinary:    (-) renal disease   Endo:    (-) Type I DM and Type II DM   Psychiatric/Substance Use:       Infectious Disease:       Malignancy:   (+) Malignancy, History of Other.Other CA Bladder status post.    Other: Comment: Open angle glaucoma           Physical Exam    Airway        Mallampati: III   TM distance: > 3 FB   Neck ROM: full   Mouth opening: < 3 cm    Respiratory Devices and Support         Dental       (+) upper dentures and missing      Cardiovascular          Rhythm  and rate: regular     Pulmonary           breath sounds clear to auscultation           OUTSIDE LABS:  CBC: No results found for: WBC, HGB, HCT, PLT  BMP: No results found for: NA, POTASSIUM, CHLORIDE, CO2, BUN, CR, GLC  COAGS: No results found for: PTT, INR, FIBR  POC: No results found for: BGM, HCG, HCGS  HEPATIC: No results found for: ALBUMIN, PROTTOTAL, ALT, AST, GGT, ALKPHOS, BILITOTAL, BILIDIRECT, TIANA  OTHER: No results found for: PH, LACT, A1C, ELI, PHOS, MAG, LIPASE, AMYLASE, TSH, T4, T3, CRP, SED    Anesthesia Plan    ASA Status:  2      Anesthesia Type: General.     - Airway: ETT   Induction: Intravenous, Propofol.   Maintenance: Balanced.   Techniques and Equipment:     - Airway: Video-Laryngoscope     - Lines/Monitors: 2nd IV, Arterial Line     Consents    Anesthesia Plan(s) and associated risks, benefits, and realistic alternatives discussed. Questions answered and patient/representative(s) expressed understanding.     - Discussed with:  Patient         Postoperative Care    Pain management: Postop Epidural, Multi-modal analgesia.   PONV prophylaxis: Ondansetron (or other 5HT-3), Dexamethasone or Solumedrol, Background Propofol Infusion     Comments:                Corbin Rodríguez MD

## 2021-08-11 NOTE — ANESTHESIA PROCEDURE NOTES
Epidural catheter Procedure Note  Pre-Procedure   Staff -        Anesthesiologist:  Corbin Rodríguez MD       Performed By: Anesthesiologist       Location: pre-op       Pre-Anesthestic Checklist: patient identified, risks and benefits discussed, informed consent, monitors and equipment checked, pre-op evaluation, at physician/surgeon's request and post-op pain management  Timeout:       Correct Patient: Yes        Correct Procedure: Yes        Correct Site: Yes        Correct Position: Yes   Procedure Documentation  Procedure: epidural catheter       Patient Position: sitting       Skin prep: Chloraprep      Local skin infiltrated with mL of 1% lidocaine.        Insertion Site: T10-11. (midline approach).       Technique: LORT saline        Needle Type: Touhy needle       Needle Gauge: 17.        Needle Length (Inches): 5        Catheter: 18 G.         Catheter threaded easily.         # of attempts: 1 and  # of redirects:  1    Assessment/Narrative         Paresthesias: No.      Test dose of 3 mL at 07:19 CDT.         Test dose negative, 3 minutes after injection, for signs of intravascular, subdural, or intrathecal injection.       Insertion/Infusion Method: LORT saline       Aspiration negative for Heme or CSF via Epidural Catheter.    Comments:  Risks, benefits, alternatives of epidural analgesia discussed with the patient who agrees to proceed.  After a procedural timeout confirming the correct patient and details of the procedure, 1% lidocaine was injected superficially over the skin for topical anesthesia.  A 17 G Tuohy needle was advanced at the above lumbar interspace until contact was felt with ligamentum flavum.  Loss of resistance to saline was encountered at above noted depth.  3 ml of saline was injected to expand the epidural space.  The epidural catheter was then easily threaded to the depth noted above.  Paresthesias were as noted above.Ropivacaine 10 ml bolus via epidural catheter administered  at end of procedure.  Patient tolerated well.

## 2021-08-11 NOTE — ANESTHESIA PROCEDURE NOTES
Arterial Line Procedure Note  Pre-Procedure   Staff -        Anesthesiologist:  Corbin Rodríguez MD       Performed By: Anesthesiologist       Location: pre-op       Pre-Anesthestic Checklist: patient identified, IV checked, site marked, risks and benefits discussed, informed consent, monitors and equipment checked and pre-op evaluation  Timeout:       Correct Patient: Yes        Correct Procedure: Yes        Correct Site: Yes        Correct Position: Yes   Procedure   Procedure: arterial line       Laterality: right       Insertion Site: radial.  Sterile Prep        All elements of maximal sterile barrier technique followed       Skin prep: Chloraprep  Insertion/Injection        Technique: Seldinger Technique        Catheter Type/Size: 20 gauge, 1.75 in/4.5 cm quick cath (integral wire)  Narrative        Tegaderm dressing used.     Arterial waveform: Yes   Comments:  Site sterilely prepped with Chloraprep.  Lidocaine 1% used for skin topicalization.  Right radial artery palpated.  Arrow catheter advanced into radial artery, with return of bright red blood.  Pulsatile waveform on monitor.  Patient tolerate procedure well.

## 2021-08-11 NOTE — PLAN OF CARE
A/Ox4. VSS ex soft Bps. 1L NC, sating well. CAPNO is WNL. Npo. Denies pain, N/V, or SOB. Fentanyl and Bupivacaine epidural infusion running at 8 ml/hr. Epidural injection site is CDI, covered w/ clear dressing. NS running at 100 ml/hr. Right urostomy connected to a maldonado bag. Urine is pink tinged. Right and left ABDIRIZAK drains in place, left ABDIRIZAK is a pelvic drain. Abdominal midline incision dressing has no new drainage and is CDI.

## 2021-08-11 NOTE — PROGRESS NOTES
[facility]  [facility] OR  PRE-SURGICAL VISIT    Fr Corbin pre-surgical visit with patient.  Provided spiritual support and Sacrament of Anointing.    [facility]   [phone] (pager)  [phone] (office)

## 2021-08-11 NOTE — ANESTHESIA PROCEDURE NOTES
Airway       Patient location during procedure: OR       Procedure Start/Stop Times: 8/11/2021 7:52 AM  Staff -        CRNA: Domitila Lea APRN CRNA       Performed By: CRNA  Consent for Airway        Urgency: elective  Indications and Patient Condition       Indications for airway management: reyes-procedural       Induction type:intravenous       Mask difficulty assessment: 2 - vent by mask + OA or adjuvant +/- NMBA    Final Airway Details       Final airway type: endotracheal airway       Successful airway: ETT - single  Endotracheal Airway Details        ETT size (mm): 7.0       Cuffed: yes       Successful intubation technique: video laryngoscopy       VL Blade Size: Glidescope 3       Grade View of Cords: 1       Adjucts: stylet       Position: Right       Measured from: lips       Secured at (cm): 21       Bite block used: None    Post intubation assessment        Placement verified by: capnometry, equal breath sounds and chest rise        Number of attempts at approach: 1       Secured with: pink tape       Ease of procedure: easy       Dentition: Unchanged

## 2021-08-12 LAB
ANION GAP SERPL CALCULATED.3IONS-SCNC: 3 MMOL/L (ref 3–14)
BUN SERPL-MCNC: 23 MG/DL (ref 7–30)
CALCIUM SERPL-MCNC: 8.1 MG/DL (ref 8.5–10.1)
CHLORIDE BLD-SCNC: 112 MMOL/L (ref 94–109)
CO2 SERPL-SCNC: 26 MMOL/L (ref 20–32)
CREAT SERPL-MCNC: 1.1 MG/DL (ref 0.52–1.04)
ERYTHROCYTE [DISTWIDTH] IN BLOOD BY AUTOMATED COUNT: 14 % (ref 10–15)
GFR SERPL CREATININE-BSD FRML MDRD: 48 ML/MIN/1.73M2
GLUCOSE BLD-MCNC: 113 MG/DL (ref 70–99)
HCT VFR BLD AUTO: 35.9 % (ref 35–47)
HGB BLD-MCNC: 11.2 G/DL (ref 11.7–15.7)
MAGNESIUM SERPL-MCNC: 2.5 MG/DL (ref 1.6–2.3)
MCH RBC QN AUTO: 30.1 PG (ref 26.5–33)
MCHC RBC AUTO-ENTMCNC: 31.2 G/DL (ref 31.5–36.5)
MCV RBC AUTO: 97 FL (ref 78–100)
PHOSPHATE SERPL-MCNC: 3.8 MG/DL (ref 2.5–4.5)
PLATELET # BLD AUTO: 253 10E3/UL (ref 150–450)
POTASSIUM BLD-SCNC: 4.5 MMOL/L (ref 3.4–5.3)
RBC # BLD AUTO: 3.72 10E6/UL (ref 3.8–5.2)
SODIUM SERPL-SCNC: 141 MMOL/L (ref 133–144)
WBC # BLD AUTO: 9.2 10E3/UL (ref 4–11)

## 2021-08-12 PROCEDURE — 250N000013 HC RX MED GY IP 250 OP 250 PS 637: Performed by: PHYSICIAN ASSISTANT

## 2021-08-12 PROCEDURE — 83735 ASSAY OF MAGNESIUM: CPT | Performed by: PHYSICIAN ASSISTANT

## 2021-08-12 PROCEDURE — 250N000011 HC RX IP 250 OP 636: Performed by: PHYSICIAN ASSISTANT

## 2021-08-12 PROCEDURE — 80048 BASIC METABOLIC PNL TOTAL CA: CPT | Performed by: PHYSICIAN ASSISTANT

## 2021-08-12 PROCEDURE — 120N000001 HC R&B MED SURG/OB

## 2021-08-12 PROCEDURE — 84100 ASSAY OF PHOSPHORUS: CPT | Performed by: PHYSICIAN ASSISTANT

## 2021-08-12 PROCEDURE — C9113 INJ PANTOPRAZOLE SODIUM, VIA: HCPCS | Performed by: PHYSICIAN ASSISTANT

## 2021-08-12 PROCEDURE — 85027 COMPLETE CBC AUTOMATED: CPT | Performed by: PHYSICIAN ASSISTANT

## 2021-08-12 PROCEDURE — 258N000003 HC RX IP 258 OP 636: Performed by: PHYSICIAN ASSISTANT

## 2021-08-12 PROCEDURE — 36415 COLL VENOUS BLD VENIPUNCTURE: CPT | Performed by: PHYSICIAN ASSISTANT

## 2021-08-12 PROCEDURE — 258N000003 HC RX IP 258 OP 636: Performed by: ANESTHESIOLOGY

## 2021-08-12 PROCEDURE — 250N000011 HC RX IP 250 OP 636: Performed by: ANESTHESIOLOGY

## 2021-08-12 PROCEDURE — 999N000198 HC STATISTIC WOC PT EDUCATION, 16-30 MIN

## 2021-08-12 RX ADMIN — CEFAZOLIN 1 G: 1 INJECTION, POWDER, FOR SOLUTION INTRAMUSCULAR; INTRAVENOUS at 19:46

## 2021-08-12 RX ADMIN — METOCLOPRAMIDE HYDROCHLORIDE 10 MG: 5 INJECTION INTRAMUSCULAR; INTRAVENOUS at 05:26

## 2021-08-12 RX ADMIN — CEFAZOLIN 1 G: 1 INJECTION, POWDER, FOR SOLUTION INTRAMUSCULAR; INTRAVENOUS at 03:06

## 2021-08-12 RX ADMIN — LATANOPROST 1 DROP: 50 SOLUTION/ DROPS OPHTHALMIC at 21:12

## 2021-08-12 RX ADMIN — ACETAMINOPHEN 975 MG: 325 TABLET, FILM COATED ORAL at 05:26

## 2021-08-12 RX ADMIN — ACETAMINOPHEN 975 MG: 325 TABLET, FILM COATED ORAL at 17:43

## 2021-08-12 RX ADMIN — BRIMONIDINE TARTRATE 1 DROP: 1.5 SOLUTION/ DROPS OPHTHALMIC at 08:27

## 2021-08-12 RX ADMIN — ACETAMINOPHEN 975 MG: 325 TABLET, FILM COATED ORAL at 11:15

## 2021-08-12 RX ADMIN — METOCLOPRAMIDE HYDROCHLORIDE 10 MG: 5 INJECTION INTRAMUSCULAR; INTRAVENOUS at 11:15

## 2021-08-12 RX ADMIN — SODIUM CHLORIDE: 9 INJECTION, SOLUTION INTRAVENOUS at 13:15

## 2021-08-12 RX ADMIN — ALVIMOPAN 12 MG: 12 CAPSULE ORAL at 21:12

## 2021-08-12 RX ADMIN — ACETAMINOPHEN 975 MG: 325 TABLET, FILM COATED ORAL at 23:33

## 2021-08-12 RX ADMIN — ALVIMOPAN 12 MG: 12 CAPSULE ORAL at 08:25

## 2021-08-12 RX ADMIN — PANTOPRAZOLE SODIUM 40 MG: 40 INJECTION, POWDER, FOR SOLUTION INTRAVENOUS at 08:25

## 2021-08-12 RX ADMIN — SODIUM CHLORIDE: 9 INJECTION, SOLUTION INTRAVENOUS at 23:33

## 2021-08-12 RX ADMIN — METOCLOPRAMIDE HYDROCHLORIDE 10 MG: 5 INJECTION INTRAMUSCULAR; INTRAVENOUS at 17:44

## 2021-08-12 RX ADMIN — SODIUM CHLORIDE: 9 INJECTION, SOLUTION INTRAVENOUS at 02:55

## 2021-08-12 RX ADMIN — METOCLOPRAMIDE HYDROCHLORIDE 10 MG: 5 INJECTION INTRAMUSCULAR; INTRAVENOUS at 23:33

## 2021-08-12 RX ADMIN — FENTANYL CITRATE: 0.05 INJECTION, SOLUTION INTRAMUSCULAR; INTRAVENOUS at 20:06

## 2021-08-12 RX ADMIN — CEFAZOLIN 1 G: 1 INJECTION, POWDER, FOR SOLUTION INTRAMUSCULAR; INTRAVENOUS at 11:15

## 2021-08-12 ASSESSMENT — ACTIVITIES OF DAILY LIVING (ADL)
ADLS_ACUITY_SCORE: 21
ADLS_ACUITY_SCORE: 20
ADLS_ACUITY_SCORE: 19
ADLS_ACUITY_SCORE: 21
ADLS_ACUITY_SCORE: 21
ADLS_ACUITY_SCORE: 20

## 2021-08-12 ASSESSMENT — MIFFLIN-ST. JEOR: SCORE: 1283.29

## 2021-08-12 NOTE — PROGRESS NOTES
Anesthesiology Progress Note     Patient: Felicitas Victor  Surgery: Procedure(s):  ANTERIOR PELVIC EXENTERATION, BILATERAL PELVIC LYMPH NODE DISSECTION  ILEAL CONDUIT URINARY DIVERSION,  APPENDECTOMY  Surgery Date: 8/11/2021    ASA Status: 2 - Mild systemic disease  Epidural Solution: bupivacaine with fentanyl             Rate: 8mL/hr    Subjective:   - Pt doing well, sitting up in a chair, walked to the door this morning, pain well controlled - right side is numb from T7 - L2, left side has full sensation but no pain.   - Pain score: 0/10    Objective:   Vitals: Temp: 37  C (98.6  F) Temp src: Oral BP: 106/51 Pulse: 90   Resp: 20 SpO2: 94 % O2 Device: Nasal cannula Oxygen Delivery: 1 LPM    Focused Physical Exam:     Neuro: 5/5 strength in upper extremities, 5/5 strength in upper extremities   Epidural Site: Clean, dry, and intact. No erythema/redness around the site    Labs:   Recent Labs   Lab Test 08/11/21  0606   HGB 13.9     No results found for: INR   No results found for: PTT     Meds:    acetaminophen  975 mg Oral Q6H     alvimopan  12 mg Oral BID     brimonidine  1 drop Both Eyes QAM     ceFAZolin  1 g Intravenous Q8H     latanoprost  1 drop Both Eyes QPM     metoclopramide  10 mg Intravenous Q6H     pantoprazole (PROTONIX) IV  40 mg Intravenous Daily with breakfast     sodium chloride (PF)  3 mL Intracatheter Q8H       Assessment & Plan:   Felicitas Victor is a 76 year old yo female who is 1 Day Post-Op from a Procedure(s):  ANTERIOR PELVIC EXENTERATION, BILATERAL PELVIC LYMPH NODE DISSECTION  ILEAL CONDUIT URINARY DIVERSION,  APPENDECTOMY. She is being followed by Anesthesia to evaluate a continuous epidural.   - as pain is well controled continue epidural at current rate.   - Will continue to follow    Rolando Hunter MD   August 12, 2021 6:11 AM     Total Time Spent With Patient: 7037-8667

## 2021-08-12 NOTE — PLAN OF CARE
A&Ox4, lethargic. VSS on RA, capno WDL. Denies pain, epidural infusing at 8 mL/hr; site CDI. Dermatomes WDL, full sensation on left. Able to bend knees/ankles, L>R. Denies nausea/SOB. Urostomy w/ stents to maldonado bag, OP adequate. ABDIRIZAK x 2 w/ blood OP, dressings CDI. Midline incision CDI. Vaginal packing to be removed 8/12, scant serosanguinous drainage. PIV infusing IVF. NPO + ice. A2 + walker/GB, ambulated in room x1, up in chair. Discharge pending.

## 2021-08-12 NOTE — PROGRESS NOTES
[facility]    Urology Progress Note     Assessment & Plan   Felicitas Victor is a 76 year old female who was admitted on 8/11/2021. POD #1 s/p anterior pelvic exenteration with ileal conduit urinary diversion with Dr. Cai-- doing well     Plan:   -Neuro: cont epidural for pain, working well; IV tylenol scheduled   -CV: follow vitals and labs, ICDs  -Renal: cont IVF, follow outputs   -GI: entereg, reglan, protonix scheduled; start 8oz of clears today   -: cont stents and drains, WOCN to see; vaginal packing removed   -ID: cont ancef IV x 3 days   -Resp: encourage IS  -PPX: increase ambulation (at least qid); ICDs; start sqh tomorrow if hb remains stable       ADDM (1621): pt doing well into this afternoon, ambulating more, still with adequate pain control   Likely plan to advanced to unrestricted clear liquid diet tomorrow      Eve Dietz PA-C  [facility]   [email or web address]  Text Page (7:30am to 4:30pm)      Interval History   No events overnight   Pain well controlled with epidural   Has ambulated in room and sat in chair, went well; no dizziness   Denies nausea     AVSS  Creat 1.1  Hb 11.2    /515  ABDIRIZAK 90/265 (abdominal), 10/35 (subcutaneous)    Physical Exam   Temp: (!) 95.7  F (35.4  C) (doesn't feel hot or cold ) Temp src: Axillary BP: 119/61 Pulse: 70   Resp: 15 SpO2: 98 % O2 Device: Nasal cannula Oxygen Delivery: 1 LPM  Vitals:    08/11/21 0632 08/12/21 0646   Weight: 78.5 kg (173 lb) 80.2 kg (176 lb 14.4 oz)     Vital Signs with Ranges  Temp:  [95.7  F (35.4  C)-98.6  F (37  C)] 95.7  F (35.4  C)  Pulse:  [54-90] 70  Resp:  [8-28] 15  BP: ()/(42-87) 119/61  MAP:  [61 mmHg-84 mmHg] 67 mmHg  Arterial Line BP: ()/(39-66) 137/43  SpO2:  [88 %-100 %] 98 %  I/O last 3 completed shifts:  In: 2080 [I.V.:2080]  Out: 965 [Urine:515; Drains:300; Blood:150]    Alert and oriented  Breathing unlabored  Abdomen soft, approp tender  post op, incision cdi with dressings  ABDIRIZAK drains with serosang output  Stoma pink, stents visible, urine dark gerhard   Vaginal packing removed   Extremities warm and well perfused, no edema      Medications     EPIDURAL INFUSION 8 mL/hr at 08/11/21 1934     - MEDICATION INSTRUCTIONS -       sodium chloride 100 mL/hr at 08/12/21 0255       acetaminophen  975 mg Oral Q6H     alvimopan  12 mg Oral BID     brimonidine  1 drop Both Eyes QAM     ceFAZolin  1 g Intravenous Q8H     latanoprost  1 drop Both Eyes QPM     metoclopramide  10 mg Intravenous Q6H     pantoprazole (PROTONIX) IV  40 mg Intravenous Daily with breakfast     sodium chloride (PF)  3 mL Intracatheter Q8H       Data   Results for orders placed or performed during the hospital encounter of 08/11/21 (from the past 24 hour(s))   Basic metabolic panel   Result Value Ref Range    Sodium 141 133 - 144 mmol/L    Potassium 4.5 3.4 - 5.3 mmol/L    Chloride 112 (H) 94 - 109 mmol/L    Carbon Dioxide (CO2) 26 20 - 32 mmol/L    Anion Gap 3 3 - 14 mmol/L    Urea Nitrogen 23 7 - 30 mg/dL    Creatinine 1.10 (H) 0.52 - 1.04 mg/dL    Calcium 8.1 (L) 8.5 - 10.1 mg/dL    Glucose 113 (H) 70 - 99 mg/dL    GFR Estimate 48 (L) >60 mL/min/1.73m2   Magnesium   Result Value Ref Range    Magnesium 2.5 (H) 1.6 - 2.3 mg/dL   Phosphorus   Result Value Ref Range    Phosphorus 3.8 2.5 - 4.5 mg/dL   CBC with platelets   Result Value Ref Range    WBC Count 9.2 4.0 - 11.0 10e3/uL    RBC Count 3.72 (L) 3.80 - 5.20 10e6/uL    Hemoglobin 11.2 (L) 11.7 - 15.7 g/dL    Hematocrit 35.9 35.0 - 47.0 %    MCV 97 78 - 100 fL    MCH 30.1 26.5 - 33.0 pg    MCHC 31.2 (L) 31.5 - 36.5 g/dL    RDW 14.0 10.0 - 15.0 %    Platelet Count 253 150 - 450 10e3/uL

## 2021-08-12 NOTE — CONSULTS
[facility] Nurse Inpatient Ostomy Assessment     Assessment of new end Ileal Conduit     Surgery date:  8/11/21   Surgeon:  Dr. Francis Cai  Procedure:anterior pelvic exenteration with ileal conduit urinary diversion    Related diagnosis:  Muscle Invasive Bladder Cancer    [facility] Assessment & Physical Exam:        Current status: Patient sitting up in chair. Alert and pleasant. Friend at bedside, both attentive to education.      Date of last photo: no photo 8/11 pouch in place.      Stoma size: Pouch in place, approx 1inch    Stoma appearance: viable and red with 2 ureteral stents in place.    Mucocutaneous junction:  UTV, pouch in place    Peristomal complication(s): none visualized, pouching system in place ,     Abdominal assessment: distended    Surgical site(s): dressing dry and intact, ABDIRIZAK x2, L ABDIRIZAK with significant output    NG still in place? No    Output: bloody     Pain: Dull ache    Is patient still on a PCA? Yes      Current pouching system: [place] 1piece CTF urine pouch. attached to bedside drainage    Pouch last changed:  8/11, placed in surgery    Reason for pouch change today: pouch not changed today      Learning and comprehension: Patient receptive to education. Patient visualized pouch. Return demonstration of opening and closing drain and attaching and detaching bedside drainage. Patient not interested in pouch change today, and pouch in place. Discussed diet, s/s of UTI, hand hygiene, and how to clean night time drainage.    Psychosocial: Patient lives alone, no family available. Patient has Angelia sanz visiting in the hospital. Eliana Sanz, will be assisting patient at home but unable to come to the hospital      WO Plan:         Pouching product plan: Salma 1 piece flat urine.    Comments: Patient receptive to education.      Frequency of pouch changes: pouch intact after 1 day      Pt support system on discharge: Eliana Sanz will be staying  with patient on discharge.    WOC recommend home care?  yes      WOC follow-up plan: daily      Objective Data:     Patient history according to medical record: Felicitas Victor is a 76 year old female who was admitted on 8/11/2021. POD #1 s/p anterior pelvic exenteration with ileal conduit urinary diversion with Dr. Cai-- doing well      Plan:   -Neuro: cont epidural for pain, working well; IV tylenol scheduled   -CV: follow vitals and labs, ICDs  -Renal: cont IVF, follow outputs   -GI: entereg, reglan, protonix scheduled; start 8oz of clears today   -: cont stents and drains, WOCN to see; vaginal packing removed   -ID: cont ancef IV x 3 days   -Resp: encourage IS    -PPX: increase ambulation (at least qid); ICDs; start sqh tomorrow if hb remains stable       Current Diet/Nutrition:   Orders Placed This Encounter      NPO for Medical/Clinical Reasons Except for: Meds, Ice Chips       Output:  I/O last 3 completed shifts:  In: 2080 [I.V.:2080]  Out: 965 [Urine:515; Drains:300; Blood:150]      Labs:   Recent Labs   Lab 08/12/21  0636   HGB 11.2*   WBC 9.2         Kieran Risk Assessment:   Sensory Perception: 3-->slightly limited  Moisture: 4-->rarely moist  Activity: 3-->walks occasionally  Mobility: 3-->slightly limited  Nutrition: 3-->adequate  Friction and Shear: 3-->no apparent problem  Kieran Score: 19      WOC Interventions:       Patient's chart evaluated    Focus of today's visit: verbal instruction , diet and hydration , pouch emptying, output measurement, odor/flatus management and lifestyle adjustments     Pouch changed: not today    Participant(s) in teaching session today: patient , friend and nurse    Change made with ostomy management today: No    Supplies: N/A, 2 piece urinary 44mm floor stock.    Educational materials: Gave urostomy education from FOD, pt reports urostomy preop packet     Preparation for discharge: No discharge preparations started    Registered for supply samples?  TBD    Discussed plan of care with: Patient and Nurse    Meli Montgomery, RN, CWOCN

## 2021-08-12 NOTE — PLAN OF CARE
POD 1 TAHBSO, ileal conduit, radical cystectomy, pelvic lymphadenectomy and appendectomy. A/O, VSS on RA. Capno WNL. Denies pain, on schedule tylenol and epidural infusing at 8ml/hr. Dermatones WDL, full sensation on L and R. Able to bend knees/ankles. Urostomy w/ 2 stents, intact, merlot UOP. WOC started ostmy teaching. BS hypo, -flatus. ABDIRIZAK x2 w/ serosanguineous OP, dressing CDI. Midline incision CDI, shadow drainage. Up A1, GB/W, ambulated in halls x2 this shift, up in chair also. Tolerating NPO, tolerating ice chips. Vaginal packing removed by PA. PIV infusing NS at 100 ml/hr w/ int abx. Plan to continue to monitor.

## 2021-08-13 ENCOUNTER — ANESTHESIA EVENT (OUTPATIENT)
Dept: ONCOLOGY | Facility: CLINIC | Age: 76
End: 2021-08-13

## 2021-08-13 ENCOUNTER — ANESTHESIA (OUTPATIENT)
Dept: ONCOLOGY | Facility: CLINIC | Age: 76
End: 2021-08-13

## 2021-08-13 LAB
ANION GAP SERPL CALCULATED.3IONS-SCNC: 9 MMOL/L (ref 3–14)
BUN SERPL-MCNC: 15 MG/DL (ref 7–30)
CALCIUM SERPL-MCNC: 8 MG/DL (ref 8.5–10.1)
CHLORIDE BLD-SCNC: 115 MMOL/L (ref 94–109)
CO2 SERPL-SCNC: 21 MMOL/L (ref 20–32)
CREAT SERPL-MCNC: 0.66 MG/DL (ref 0.52–1.04)
ERYTHROCYTE [DISTWIDTH] IN BLOOD BY AUTOMATED COUNT: 14.1 % (ref 10–15)
GFR SERPL CREATININE-BSD FRML MDRD: 85 ML/MIN/1.73M2
GLUCOSE BLD-MCNC: 85 MG/DL (ref 70–99)
HCT VFR BLD AUTO: 32.2 % (ref 35–47)
HGB BLD-MCNC: 9.9 G/DL (ref 11.7–15.7)
MAGNESIUM SERPL-MCNC: 2.4 MG/DL (ref 1.6–2.3)
MCH RBC QN AUTO: 30.2 PG (ref 26.5–33)
MCHC RBC AUTO-ENTMCNC: 30.7 G/DL (ref 31.5–36.5)
MCV RBC AUTO: 98 FL (ref 78–100)
PATH REPORT.COMMENTS IMP SPEC: NORMAL
PATH REPORT.COMMENTS IMP SPEC: NORMAL
PATH REPORT.FINAL DX SPEC: NORMAL
PATH REPORT.GROSS SPEC: NORMAL
PATH REPORT.INTRAOP OBS SPEC DOC: NORMAL
PATH REPORT.MICROSCOPIC SPEC OTHER STN: NORMAL
PATH REPORT.RELEVANT HX SPEC: NORMAL
PATHOLOGY SYNOPTIC REPORT: NORMAL
PHOSPHATE SERPL-MCNC: 1.7 MG/DL (ref 2.5–4.5)
PHOSPHATE SERPL-MCNC: 2.4 MG/DL (ref 2.5–4.5)
PHOTO IMAGE: NORMAL
PLATELET # BLD AUTO: 215 10E3/UL (ref 150–450)
POTASSIUM BLD-SCNC: 3.9 MMOL/L (ref 3.4–5.3)
RBC # BLD AUTO: 3.28 10E6/UL (ref 3.8–5.2)
SODIUM SERPL-SCNC: 145 MMOL/L (ref 133–144)
WBC # BLD AUTO: 8.7 10E3/UL (ref 4–11)

## 2021-08-13 PROCEDURE — 250N000011 HC RX IP 250 OP 636: Performed by: ANESTHESIOLOGY

## 2021-08-13 PROCEDURE — 36415 COLL VENOUS BLD VENIPUNCTURE: CPT | Performed by: PHYSICIAN ASSISTANT

## 2021-08-13 PROCEDURE — 88309 TISSUE EXAM BY PATHOLOGIST: CPT | Mod: 26 | Performed by: PATHOLOGY

## 2021-08-13 PROCEDURE — 85014 HEMATOCRIT: CPT | Performed by: PHYSICIAN ASSISTANT

## 2021-08-13 PROCEDURE — 250N000011 HC RX IP 250 OP 636: Performed by: PHYSICIAN ASSISTANT

## 2021-08-13 PROCEDURE — 84100 ASSAY OF PHOSPHORUS: CPT | Performed by: PHYSICIAN ASSISTANT

## 2021-08-13 PROCEDURE — 120N000001 HC R&B MED SURG/OB

## 2021-08-13 PROCEDURE — 258N000003 HC RX IP 258 OP 636: Performed by: PHYSICIAN ASSISTANT

## 2021-08-13 PROCEDURE — 88307 TISSUE EXAM BY PATHOLOGIST: CPT | Mod: 26 | Performed by: PATHOLOGY

## 2021-08-13 PROCEDURE — 250N000013 HC RX MED GY IP 250 OP 250 PS 637: Performed by: PHYSICIAN ASSISTANT

## 2021-08-13 PROCEDURE — 258N000003 HC RX IP 258 OP 636: Performed by: ANESTHESIOLOGY

## 2021-08-13 PROCEDURE — C9113 INJ PANTOPRAZOLE SODIUM, VIA: HCPCS | Performed by: PHYSICIAN ASSISTANT

## 2021-08-13 PROCEDURE — 88304 TISSUE EXAM BY PATHOLOGIST: CPT | Mod: 26 | Performed by: PATHOLOGY

## 2021-08-13 PROCEDURE — G0463 HOSPITAL OUTPT CLINIC VISIT: HCPCS

## 2021-08-13 PROCEDURE — 88331 PATH CONSLTJ SURG 1 BLK 1SPC: CPT | Mod: 26 | Performed by: PATHOLOGY

## 2021-08-13 PROCEDURE — 88305 TISSUE EXAM BY PATHOLOGIST: CPT | Mod: 26 | Performed by: PATHOLOGY

## 2021-08-13 PROCEDURE — 83735 ASSAY OF MAGNESIUM: CPT | Performed by: PHYSICIAN ASSISTANT

## 2021-08-13 PROCEDURE — 80048 BASIC METABOLIC PNL TOTAL CA: CPT | Performed by: PHYSICIAN ASSISTANT

## 2021-08-13 PROCEDURE — 250N000013 HC RX MED GY IP 250 OP 250 PS 637: Performed by: UROLOGY

## 2021-08-13 RX ORDER — HEPARIN SODIUM 5000 [USP'U]/.5ML
5000 INJECTION, SOLUTION INTRAVENOUS; SUBCUTANEOUS EVERY 12 HOURS
Status: DISCONTINUED | OUTPATIENT
Start: 2021-08-13 | End: 2021-08-19 | Stop reason: HOSPADM

## 2021-08-13 RX ORDER — DEXTROSE MONOHYDRATE, SODIUM CHLORIDE, AND POTASSIUM CHLORIDE 50; 1.49; 4.5 G/1000ML; G/1000ML; G/1000ML
INJECTION, SOLUTION INTRAVENOUS CONTINUOUS
Status: DISCONTINUED | OUTPATIENT
Start: 2021-08-13 | End: 2021-08-17

## 2021-08-13 RX ADMIN — METOCLOPRAMIDE HYDROCHLORIDE 10 MG: 5 INJECTION INTRAMUSCULAR; INTRAVENOUS at 17:37

## 2021-08-13 RX ADMIN — METOCLOPRAMIDE HYDROCHLORIDE 10 MG: 5 INJECTION INTRAMUSCULAR; INTRAVENOUS at 23:56

## 2021-08-13 RX ADMIN — ACETAMINOPHEN 975 MG: 325 TABLET, FILM COATED ORAL at 17:36

## 2021-08-13 RX ADMIN — ALVIMOPAN 12 MG: 12 CAPSULE ORAL at 21:23

## 2021-08-13 RX ADMIN — CEFAZOLIN 1 G: 1 INJECTION, POWDER, FOR SOLUTION INTRAMUSCULAR; INTRAVENOUS at 11:15

## 2021-08-13 RX ADMIN — HEPARIN SODIUM 5000 UNITS: 5000 INJECTION, SOLUTION INTRAVENOUS; SUBCUTANEOUS at 15:58

## 2021-08-13 RX ADMIN — METOCLOPRAMIDE HYDROCHLORIDE 10 MG: 5 INJECTION INTRAMUSCULAR; INTRAVENOUS at 05:44

## 2021-08-13 RX ADMIN — ACETAMINOPHEN 975 MG: 325 TABLET, FILM COATED ORAL at 05:43

## 2021-08-13 RX ADMIN — ACETAMINOPHEN 975 MG: 325 TABLET, FILM COATED ORAL at 23:57

## 2021-08-13 RX ADMIN — ALVIMOPAN 12 MG: 12 CAPSULE ORAL at 08:44

## 2021-08-13 RX ADMIN — POTASSIUM CHLORIDE, DEXTROSE MONOHYDRATE AND SODIUM CHLORIDE: 150; 5; 450 INJECTION, SOLUTION INTRAVENOUS at 14:51

## 2021-08-13 RX ADMIN — ACETAMINOPHEN 975 MG: 325 TABLET, FILM COATED ORAL at 11:16

## 2021-08-13 RX ADMIN — FENTANYL CITRATE: 0.05 INJECTION, SOLUTION INTRAMUSCULAR; INTRAVENOUS at 23:54

## 2021-08-13 RX ADMIN — CEFAZOLIN 1 G: 1 INJECTION, POWDER, FOR SOLUTION INTRAMUSCULAR; INTRAVENOUS at 02:43

## 2021-08-13 RX ADMIN — POTASSIUM & SODIUM PHOSPHATES POWDER PACK 280-160-250 MG 2 PACKET: 280-160-250 PACK at 15:51

## 2021-08-13 RX ADMIN — BRIMONIDINE TARTRATE 1 DROP: 1.5 SOLUTION/ DROPS OPHTHALMIC at 08:48

## 2021-08-13 RX ADMIN — PANTOPRAZOLE SODIUM 40 MG: 40 INJECTION, POWDER, FOR SOLUTION INTRAVENOUS at 08:44

## 2021-08-13 RX ADMIN — LATANOPROST 1 DROP: 50 SOLUTION/ DROPS OPHTHALMIC at 21:23

## 2021-08-13 RX ADMIN — POTASSIUM & SODIUM PHOSPHATES POWDER PACK 280-160-250 MG 2 PACKET: 280-160-250 PACK at 21:49

## 2021-08-13 RX ADMIN — SODIUM CHLORIDE: 9 INJECTION, SOLUTION INTRAVENOUS at 09:41

## 2021-08-13 RX ADMIN — METOCLOPRAMIDE HYDROCHLORIDE 10 MG: 5 INJECTION INTRAMUSCULAR; INTRAVENOUS at 11:12

## 2021-08-13 ASSESSMENT — MIFFLIN-ST. JEOR: SCORE: 1316.86

## 2021-08-13 ASSESSMENT — ACTIVITIES OF DAILY LIVING (ADL)
ADLS_ACUITY_SCORE: 19

## 2021-08-13 NOTE — PLAN OF CARE
POD 2.  TAHBSO, appendectomy, cysto, ileal conduit. VS stable, ex hypertensive at times, asymptomatic.  A&O x 4.  Denies pain. Fentanyl and Bupivacaine, epidural running at 8cc- site w/ dried bloody drainage, dermotome assessment/sensations unchanged from previous shift. Pt advanced to clear liquids today, taking it slow but tolerating well. Urostomy patent  w/ stents x 2, good amount of  off red urine output. Bowels hypo, not passing flatus yet. Ambulated in patel x 3 this shift, tolerating activity very well, becoming stronger. Abdominal ABDIRIZAK  drains to L and R, w/ red output, L>R. Midline incision dressing CDI.  L ABDIRIZAK dressing changed as it became saturated with fluid with the multiple walks. Subcutaneous heparin started today per Urology. Discharge pending.

## 2021-08-13 NOTE — PROVIDER NOTIFICATION
MD Notification    Notified Person: MD    Notified Person Name: Dr. Trell Blanco    Notification Date/Time: 8/13/21 at 1335    Notification Interaction: pager    Purpose of Notification: Phosphorus 1.7.  Would you like Phosphorus replacement protocol?  Na+ Mag also elevated.     Orders Received: no new orders received    Comments:

## 2021-08-13 NOTE — PROGRESS NOTES
Writer contacted floor pharmacyst  regarding heparin injection and asked whether okay to proceed with giving the blood thinner, considering pt is getting continuous epidural medication. Per pharmacist, okay to give at this time, however we will have to hold medication when there is a plan for epidural to be removed. No plans for epidural to be removed were noted at this time.

## 2021-08-13 NOTE — PROGRESS NOTES
"New Ileal conduit (urostomy):  Discharged with stents x 2 in place    Surgery date:  8/11/21  Surgeon:  Dr. Francis Cai  Procedure:  anterior pelvic exenteration with ileal conduit urinary diversion    Related diagnosis: Muscle Invasive Bladder Cancer    1.  Change appliance 2-3 times a week and as needed for any leakage.    2.  Empty pouch of urine when 1/3 full of urine.  3.  May shower with pouch on/off.  Towel dry/Blow dry (on cool setting) tape on barrier and cloth backing on pouch.  4.  Walk frequently, may take stairs.  No heavy lifting.  5.  Eat 6 small meals/day.  Initially avoid raw vegetables, seeds, nuts peels, grapefruit, oranges, pineapple.  Gradually add them back in your diet in a few weeks as desired.  6.  Drink plenty of fluids.      Supplies:  (Ask your home care nurse to help you order supplies.  Your product plan may change over time, as your stoma and/or your abdomen changes).      nivio New Image 2pc system  (20 appliance changes/month)  1.  Barrier:   CeraPlus, 44mm convex, cut to fit, with tape    -    #45772      (5 per box = 4 box/month)   2.  Pouch:  Ultra clear, urinary, 44mm    -    #72524  (10 per box [includes 2 adaptors] = 2 box/month)  3.  Ring: (if needed for leakage): CeraRing 2\"    -    #8805   (10 per box = 2 box/month)  Or                   Stevie 2\" cohesive seal    (ConvaTec)     -     #395245   (10 per box = 1-2 box/month)   4.  Bedside drainage bag    (Bard)     -    (1 bag/month)  5.  3M Cavilon no-sting skin barrier (optional if needed)   -   #4744  (50/box = 1 box as needed)  6.  nivio adhesive remover spray (optional)   -    #7737  (1/box = 1 box as needed)    7.  Stoma powder (if needed)   -    #7906  (1 bottle as needed)  8.  Ostomy belt (optional)   -    #7300 (medium) or #7299  (large) - (box of 10 = 1 box as needed)  9.  Odor eliminator drops (optional)   -   #7717  (8oz bottle) or #7715 (1oz bottle, box of 12)  10.  /decrystalizer for " "bedside bag      -    #7736 (1 box of 12 bottles, 16oz each - as needed)  11.  Extra drain tube adaptors for bedside bag     -     #7331 (1 box of 10, as needed)       Pouching procedure:  1.  Cut out opening in barrier following pattern.   (approx. 1/8\" larger than stoma)  2.  Remove plastic backing.  3.  Open ring, stretch and apply around the cut opening on sticky side of barrier.  Press into place.  Note:  can 'build up' the ring to fill in any divots in peristomal skin.     4.  Fold back edge of paper that covers the tape to create a \"tab.\"  This assists with paper removal later on.  5.  May attach pouch to barrier at this time.  Set prepared pouch aside.  6.  Remove old pouch from around stoma.  Grasp stents to avoid accidental removal.  7.  Discard pouching system.  8.  Cleanse around stoma with water only.  Dry well.     9.  Apply pouch:  Ensure skin completely dry.  Pull up on abdomen to create flat pouching surface.  Thread the stents through opening in barrier.  Center stoma in barrier opening and press barrier firmly to skin.  10.  Pull on \"tabs\" to remove paper that covers the tape.  Press tape to skin.  Ok if there are wrinkles in the tape.    11.  Attach pouch to barrier, if have not already done.  Ensure pouch is closed.   12.  Hold warm hand over stoma/barrier for a few minutes, to help pouch form a good secure bond with the skin.        Cleansing Night Drainage Bag  1.  Empty urine from bag in the morning.  2.  Cleanse tubing/bag with antibacterial soap.   3.  Rinse until clear, with water.  4.  Hang to dry.  5.  1-2x/week:  rinse with 50/50 vinegar/water solution.    Online ostomy resources: [facility] website, has good videos - [email or web address]   - See also [email or web address]; [email or web address].org  - Smart phone maria elena: Ostobuddy      Follow-up as needed (after home care is done):     Meli Montgomery, Anselmo Pena, Skyla Hill, Anne Bansal - CWOCNs (ostomy " nurses)  Clinic room is on 1st floor in [facility] - check in at [facility] Desk in [facility] phone # [phone] (Mon - Fri) - call for any questions or concerns

## 2021-08-13 NOTE — PROGRESS NOTES
[facility]    Urology Progress Note     Assessment & Plan   Felicitas Victor is a 76 year old female who was admitted on 8/11/2021. POD #2 s/p anterior pelvic exenteration with ileal conduit urinary diversion with Dr. Cai-- doing well      Plan:   -Neuro: cont epidural for pain, working well; tylenol scheduled   -CV: follow vitals and labs, ICDs  -Renal: cont IVF, follow outputs, phos replacement   -GI: entereg, reglan, protonix scheduled; unrestricted clear liquid diet today   -: cont stents and drains, WOCN to see; vaginal packing removed yd  -ID: cont ancef IV x 3 days   -Resp: encourage IS  -PPX: increase ambulation (at least qid); ICDs; start sqh         Eve Dietz PA-C  [facility]   [email or web address]  Text Page (7:30am to 4:00pm)    Interval History   No events overnight   Still with good pain control with epidural   Ambulating well   chalo sips of clears     AVSS  Creat 0.66  WBC 8.7  Hb 9.9    /1250  ABDIRIZAK 75/210 (abdominal), 10/155 (subcutaneous)    Physical Exam   Temp: 98.2  F (36.8  C) Temp src: Oral BP: (!) 167/66 Pulse: 85   Resp: 16 SpO2: 94 % O2 Device: None (Room air)    Vitals:    08/11/21 0632 08/12/21 0646 08/13/21 0537   Weight: 78.5 kg (173 lb) 80.2 kg (176 lb 14.4 oz) 83.6 kg (184 lb 4.8 oz)     Vital Signs with Ranges  Temp:  [97  F (36.1  C)-98.9  F (37.2  C)] 98.2  F (36.8  C)  Pulse:  [74-86] 85  Resp:  [16] 16  BP: (129-168)/(55-73) 167/66  SpO2:  [93 %-96 %] 94 %  I/O last 3 completed shifts:  In: 2976 [P.O.:480; I.V.:2496]  Out: 1615 [Urine:1250; Drains:365]    Alert and oriented  Breathing unlabored  Abdomen soft, approp tender post op, incision cdi with dressings  ABDIRIZAK drains with serosang output  Stoma pink, stents visible, urine dark gerhard   Vaginal packing removed   Extremities warm and well perfused, no edema    Medications     EPIDURAL INFUSION 8 mL/hr at 08/13/21 0507     - MEDICATION INSTRUCTIONS -        sodium chloride 100 mL/hr at 08/13/21 0941       acetaminophen  975 mg Oral Q6H     alvimopan  12 mg Oral BID     brimonidine  1 drop Both Eyes QAM     heparin ANTICOAGULANT  5,000 Units Subcutaneous Q12H     latanoprost  1 drop Both Eyes QPM     metoclopramide  10 mg Intravenous Q6H     pantoprazole (PROTONIX) IV  40 mg Intravenous Daily with breakfast     sodium chloride (PF)  3 mL Intracatheter Q8H       Data   Results for orders placed or performed during the hospital encounter of 08/11/21 (from the past 24 hour(s))   Basic metabolic panel   Result Value Ref Range    Sodium 145 (H) 133 - 144 mmol/L    Potassium 3.9 3.4 - 5.3 mmol/L    Chloride 115 (H) 94 - 109 mmol/L    Carbon Dioxide (CO2) 21 20 - 32 mmol/L    Anion Gap 9 3 - 14 mmol/L    Urea Nitrogen 15 7 - 30 mg/dL    Creatinine 0.66 0.52 - 1.04 mg/dL    Calcium 8.0 (L) 8.5 - 10.1 mg/dL    Glucose 85 70 - 99 mg/dL    GFR Estimate 85 >60 mL/min/1.73m2   Magnesium   Result Value Ref Range    Magnesium 2.4 (H) 1.6 - 2.3 mg/dL   Phosphorus   Result Value Ref Range    Phosphorus 1.7 (L) 2.5 - 4.5 mg/dL   CBC with platelets   Result Value Ref Range    WBC Count 8.7 4.0 - 11.0 10e3/uL    RBC Count 3.28 (L) 3.80 - 5.20 10e6/uL    Hemoglobin 9.9 (L) 11.7 - 15.7 g/dL    Hematocrit 32.2 (L) 35.0 - 47.0 %    MCV 98 78 - 100 fL    MCH 30.2 26.5 - 33.0 pg    MCHC 30.7 (L) 31.5 - 36.5 g/dL    RDW 14.1 10.0 - 15.0 %    Platelet Count 215 150 - 450 10e3/uL

## 2021-08-13 NOTE — PROGRESS NOTES
"[facility] Nurse Inpatient Ostomy Assessment     Assessment of new end Ileal Conduit     Surgery date:  8/11/21   Surgeon:  Dr. Francis Cai  Procedure:anterior pelvic exenteration with ileal conduit urinary diversion    Related diagnosis:  Muscle Invasive Bladder Cancer    [facility] Assessment & Physical Exam:        Current status: Patient laying in bed. Alert and pleasant. Attentive and interactive      Date of last photo: 8/13/21                  Stoma size: 1\" to RLQ, sits in small bowl of tissue. Os pointing down to 6 o clock    Stoma appearance: viable, moist, red with 2 ureteral stents in place.    Mucocutaneous junction:  intact    Peristomal complication(s): none     Abdominal assessment: soft    Surgical site(s): dressing dry and intact, ABDIRIZAK x2 with minimal drainage around sites    NG still in place? No    Output: bloody, maroon     Pain: Dull ache    Is patient still on a PCA? Yes      Current pouching system: 44mm Salma 2 piece convex CTF urine pouch with ring attached to bedside drainage    Pouch last changed:  8/13, 2 days of wear time with some leakage    Reason for pouch change today: ostomy education and leakage, minor leakage noted from 4-7o clock      Learning and comprehension: Patient receptive to education. Patient visualized pouch. Return demonstration of opening and closing drain, emptying, and attaching and detaching bedside drainage. Patient removed pouching system with instruction, cut pouch, applied barrier ring, applied skin barrier and attached pouch with cueing and minimal assistance. Discussed diet, s/s of UTI,  hygiene, and how to clean night time drainage. Discussed always carrying an additional pouching system    Psychosocial: Patient lives alone, no family available. Patient has Agnelia daniel visiting in the hospital. FriendTheresa, will be assisting patient at home but unable to come to the hospital and will not be there 24/7. Has a niece " available who is an LPN at a LTC facility.      WOC Plan:         Pouching product plan: .44mm [place] 2 piece convex CTF urine pouch with ring, consider belt    Comments: Patient receptive to education. Performed most of pouch change independently with instruction.      Frequency of pouch changes: 1-2days      Pt support system on discharge: Friend, Eliana will be staying with patient on discharge. Niece is an LPN who can assist at times    WOC recommend home care?  yes      WOC follow-up plan: daily      Objective Data:     Patient history according to medical record: Felicitas Victor is a 76 year old female who was admitted on 8/11/2021. POD #1 s/p anterior pelvic exenteration with ileal conduit urinary diversion with Dr. Cai-- doing well      Plan:   -Neuro: cont epidural for pain, working well; IV tylenol scheduled   -CV: follow vitals and labs, ICDs  -Renal: cont IVF, follow outputs   -GI: entereg, reglan, protonix scheduled; start 8oz of clears today   -: cont stents and drains, WOCN to see; vaginal packing removed   -ID: cont ancef IV x 3 days   -Resp: encourage IS    -PPX: increase ambulation (at least qid); ICDs; start sqh tomorrow if hb remains stable       Current Diet/Nutrition:   Orders Placed This Encounter      Clear Liquid Diet       Output:  I/O last 3 completed shifts:  In: 2976 [P.O.:480; I.V.:2496]  Out: 1615 [Urine:1250; Drains:365]      Labs:   Recent Labs   Lab 08/13/21  0826   HGB 9.9*   WBC 8.7         Kieran Risk Assessment:   Sensory Perception: 3-->slightly limited  Moisture: 4-->rarely moist  Activity: 3-->walks occasionally  Mobility: 3-->slightly limited  Nutrition: 2-->probably inadequate  Friction and Shear: 3-->no apparent problem  Kieran Score: 18      WOC Interventions:       Patient's chart evaluated    Focus of today's visit: refitting of appliance, evaluate leakage issue, pouch change demonstration , pouch change return demonstration, verbal instruction , diet and  hydration , pouch emptying, output measurement, odor/flatus management, lifestyle adjustments and discharge instructions     Pouch changed: 8/13    Participant(s) in teaching session today: patient  and nurse    Change made with ostomy management today: Yes, convexity added    Supplies: Ordered 44mm convex, pouches, adapt rings, and belt    Educational materials: Gave urostomy education from FOD, pt reports urostomy preop packet at home    Preparation for discharge: Started    Registered for supply samples? TBD, pt discussed TCU as an option today, TCU vs Home with HC    Discussed plan of care with: Patient and Nurse    Meli Montgomery, RN, CWOCN

## 2021-08-13 NOTE — PLAN OF CARE
POD2: TAHBSO, appendectomy, cysto, ileal conduit. A/O x4. VSS ex slight HTN. Pain controlled with epidural @ 8ml/hr, scheduled tylenol. Denies N/V, ice chips and 8oz water per shift diet. Dermatomes notes sensation on both sides but states lesser sensation on R. Bilateral ABDIRIZAK drains with bloody OP. Urostomy with 2 stents, draining good UOP. Midline dressing with small dried bloody drainage at bottom near ABDIRIZAK site. L ABDIRIZAK dressing with drainage shadowing. Up Ax1 GB/W, ambulated well on day/rossy, NOOB overnight. Denies flatus, BS audible but hypoactive. Discharge plan pending.

## 2021-08-13 NOTE — PROGRESS NOTES
Anesthesiology Progress Note      Patient: Felicitas Victor  Surgery: Procedure(s):  ANTERIOR PELVIC EXENTERATION, BILATERAL PELVIC LYMPH NODE DISSECTION  ILEAL CONDUIT URINARY DIVERSION,  APPENDECTOMY  Surgery Date: 8/11/2021     ASA Status: 2 - Mild systemic disease  Epidural Solution: bupivacaine with fentanyl                        Rate: 8mL/hr     Subjective:   - Pt doing well, sitting up in bed,  pain well controlled     - Pain score: 0/10     Objective:   Vitals: stable   Focused Physical Exam:                Neuro: 5/5 strength in upper extremities, 5/5 strength in upper extremities              Epidural Site: Clean, dry, and intact. No erythema/redness around the site                                                                   Assessment & Plan:   Felicitas Victor is a 76 year old yo female who is 2 Day Post-Op from a Procedure(s):  ANTERIOR PELVIC EXENTERATION, BILATERAL PELVIC LYMPH NODE DISSECTION  ILEAL CONDUIT URINARY DIVERSION,  APPENDECTOMY. She is being followed by Anesthesia to evaluate a continuous epidural.   - as pain is well controled continue epidural at current rate.   - Will continue to follow     Angel Steiner MD   August 13, 2021 6:11 AM      Total Time Spent With Patient: [phone]

## 2021-08-13 NOTE — DISCHARGE INSTRUCTIONS
"New Ileal conduit (urostomy):  Discharged with stents x 2 in place     Surgery date:  8/11/21  Surgeon:  Dr. Francis Cai  Procedure:  anterior pelvic exenteration with ileal conduit urinary diversion    Related diagnosis: Muscle Invasive Bladder Cancer     1.  Change appliance 2-3 times a week and as needed for any leakage.    2.  Empty pouch of urine when 1/3 full of urine.  3.  May shower with pouch on/off.  Towel dry/Blow dry (on cool setting) tape on barrier and cloth backing on pouch.  4.  Walk frequently, may take stairs.  No heavy lifting.  5.  Eat 6 small meals/day.  Initially avoid raw vegetables, seeds, nuts peels, grapefruit, oranges, pineapple.  Gradually add them back in your diet in a few weeks as desired.  6.  Drink plenty of fluids.      Supplies:  (Ask your home care nurse to help you order supplies.  Your product plan may change over time, as your stoma and/or your abdomen changes).       [place] New Image 2pc system  (20 appliance changes/month)  1.  Barrier:   CeraPlus, 44mm convex, cut to fit, with tape    -    #40057      (5 per box = 4 box/month)   2.  Pouch:  Ultra clear, urinary, 44mm    -    #52837  (10 per box [includes 2 adaptors] = 2 box/month)  3.  Ring: (if needed for leakage): CeraRing 2\"    -    #8805   (10 per box = 2 box/month)  Or                   Stevie 2\" cohesive seal    (ConvaTec)     -     #319665   (10 per box = 1-2 box/month)   4.  Bedside drainage bag    (Bard)     -    (1 bag/month)  5.  3M Cavilon no-sting skin barrier (optional if needed)   -   #8234  (50/box = 1 box as needed)  6.  [place] adhesive remover spray (optional)   -    #7737  (1/box = 1 box as needed)    7.  Stoma powder (if needed)   -    #7906  (1 bottle as needed)  8.  Ostomy belt (optional)   -    #7300 (medium) or #7299  (large) - (box of 10 = 1 box as needed)  9.  Odor eliminator drops (optional)   -   #7717  (8oz bottle) or #7715 (1oz bottle, box of 12)  10.  /decrystalizer for " "bedside bag      -    #7736 (1 box of 12 bottles, 16oz each - as needed)  11.  Extra drain tube adaptors for bedside bag     -     #7331 (1 box of 10, as needed)       Pouching procedure:  1.  Cut out opening in barrier following pattern.   (approx. 1/8\" larger than stoma)  2.  Remove plastic backing.  3.  Open ring, stretch and apply around the cut opening on sticky side of barrier.  Press into place.  Note:  can 'build up' the ring to fill in any divots in peristomal skin.     4.  Fold back edge of paper that covers the tape to create a \"tab.\"  This assists with paper removal later on.  5.  May attach pouch to barrier at this time.  Set prepared pouch aside.  6.  Remove old pouch from around stoma.  Grasp stents to avoid accidental removal.  7.  Discard pouching system.  8.  Cleanse around stoma with water only.  Dry well.     9.  Apply pouch:  Ensure skin completely dry.  Pull up on abdomen to create flat pouching surface.  Thread the stents through opening in barrier.  Center stoma in barrier opening and press barrier firmly to skin.  10.  Pull on \"tabs\" to remove paper that covers the tape.  Press tape to skin.  Ok if there are wrinkles in the tape.    11.  Attach pouch to barrier, if have not already done.  Ensure pouch is closed.   12.  Hold warm hand over stoma/barrier for a few minutes, to help pouch form a good secure bond with the skin.        Cleansing Night Drainage Bag  1.  Empty urine from bag in the morning.  2.  Cleanse tubing/bag with antibacterial soap.   3.  Rinse until clear, with water.  4.  Hang to dry.  5.  1-2x/week:  rinse with 50/50 vinegar/water solution.     Online ostomy resources: Physiq website, has good videos - https://www.HandUp PBC.Be Sport/en/ostomycare/educationaltools   - See also Coloplast.us/ostomy; Ostomy.org  - Smart phone maria elena: Ostobuddy      Follow-up as needed (after home care is done):     Meli Montgomery, Anselmo Pena, Skyla Hill, Anne Bansal - CWOCNs (ostomy " nurses)  Clinic room is on 1st floor in [facility] - check in at [facility] in [facility] phone # [phone] (Mon - Fri) - call for any questions or concerns    ABDIRIZAK drain sites  Keep ABDIRIZAK drain sites on right and left covered and dry with gauze and tape for 48 hours or until incision seals off. After that can leave open to air.

## 2021-08-13 NOTE — PLAN OF CARE
POD1. Vitals stable. A&O. Ambulated halls x2 w/ 1 assist walker/gb. Denies pain. Epidural running at 8cc- site w/ dried bloody drainage, pt endorses full sensation. Tolerating ice chips and 8oz water per shift. Urostomy patent  w/ stents x2, good amount of bloody urine out. Bowels hypo, no gas yet. Abdomen L/R abdominal JPs w/ bloody out put. Midline incision dressing CDI. Likely advance diet and start subcutaneous heparin tomorrow per Urology.

## 2021-08-13 NOTE — PROVIDER NOTIFICATION
MD Notification    Notified Person: MD    Notified Person Name: Trevor Dietz PA-C ( Urology)    Notification Date/Time: 8/13/21 at 1440    Notification Interaction: pager    Purpose of Notification: Phosphorus 1.7.  Would you like Phosphorus replacement protocol?  Na+ Mag also elevated.      Orders Received: Phosphorus protocol order placed , iV fluids changed     Comments:

## 2021-08-14 LAB
ANION GAP SERPL CALCULATED.3IONS-SCNC: 3 MMOL/L (ref 3–14)
BUN SERPL-MCNC: 11 MG/DL (ref 7–30)
CALCIUM SERPL-MCNC: 8.5 MG/DL (ref 8.5–10.1)
CHLORIDE BLD-SCNC: 114 MMOL/L (ref 94–109)
CO2 SERPL-SCNC: 23 MMOL/L (ref 20–32)
CREAT SERPL-MCNC: 0.56 MG/DL (ref 0.52–1.04)
ERYTHROCYTE [DISTWIDTH] IN BLOOD BY AUTOMATED COUNT: 13.5 % (ref 10–15)
GFR SERPL CREATININE-BSD FRML MDRD: 90 ML/MIN/1.73M2
GLUCOSE BLD-MCNC: 100 MG/DL (ref 70–99)
HCT VFR BLD AUTO: 33 % (ref 35–47)
HGB BLD-MCNC: 10.4 G/DL (ref 11.7–15.7)
MAGNESIUM SERPL-MCNC: 2.3 MG/DL (ref 1.6–2.3)
MCH RBC QN AUTO: 30.6 PG (ref 26.5–33)
MCHC RBC AUTO-ENTMCNC: 31.5 G/DL (ref 31.5–36.5)
MCV RBC AUTO: 97 FL (ref 78–100)
PHOSPHATE SERPL-MCNC: 2.6 MG/DL (ref 2.5–4.5)
PHOSPHATE SERPL-MCNC: 3 MG/DL (ref 2.5–4.5)
PLATELET # BLD AUTO: 242 10E3/UL (ref 150–450)
POTASSIUM BLD-SCNC: 4.3 MMOL/L (ref 3.4–5.3)
RBC # BLD AUTO: 3.4 10E6/UL (ref 3.8–5.2)
SODIUM SERPL-SCNC: 140 MMOL/L (ref 133–144)
WBC # BLD AUTO: 7.5 10E3/UL (ref 4–11)

## 2021-08-14 PROCEDURE — 36415 COLL VENOUS BLD VENIPUNCTURE: CPT | Performed by: PHYSICIAN ASSISTANT

## 2021-08-14 PROCEDURE — 258N000003 HC RX IP 258 OP 636: Performed by: PHYSICIAN ASSISTANT

## 2021-08-14 PROCEDURE — 250N000011 HC RX IP 250 OP 636: Performed by: PHYSICIAN ASSISTANT

## 2021-08-14 PROCEDURE — 250N000013 HC RX MED GY IP 250 OP 250 PS 637: Performed by: UROLOGY

## 2021-08-14 PROCEDURE — C9113 INJ PANTOPRAZOLE SODIUM, VIA: HCPCS | Performed by: PHYSICIAN ASSISTANT

## 2021-08-14 PROCEDURE — 80048 BASIC METABOLIC PNL TOTAL CA: CPT | Performed by: PHYSICIAN ASSISTANT

## 2021-08-14 PROCEDURE — 36415 COLL VENOUS BLD VENIPUNCTURE: CPT | Performed by: UROLOGY

## 2021-08-14 PROCEDURE — 84100 ASSAY OF PHOSPHORUS: CPT | Performed by: PHYSICIAN ASSISTANT

## 2021-08-14 PROCEDURE — 250N000013 HC RX MED GY IP 250 OP 250 PS 637: Performed by: PHYSICIAN ASSISTANT

## 2021-08-14 PROCEDURE — 120N000001 HC R&B MED SURG/OB

## 2021-08-14 PROCEDURE — 85014 HEMATOCRIT: CPT | Performed by: PHYSICIAN ASSISTANT

## 2021-08-14 PROCEDURE — 83735 ASSAY OF MAGNESIUM: CPT | Performed by: PHYSICIAN ASSISTANT

## 2021-08-14 PROCEDURE — 84100 ASSAY OF PHOSPHORUS: CPT | Performed by: UROLOGY

## 2021-08-14 RX ADMIN — HEPARIN SODIUM 5000 UNITS: 5000 INJECTION, SOLUTION INTRAVENOUS; SUBCUTANEOUS at 17:05

## 2021-08-14 RX ADMIN — BRIMONIDINE TARTRATE 1 DROP: 1.5 SOLUTION/ DROPS OPHTHALMIC at 08:40

## 2021-08-14 RX ADMIN — ALVIMOPAN 12 MG: 12 CAPSULE ORAL at 08:34

## 2021-08-14 RX ADMIN — METOCLOPRAMIDE HYDROCHLORIDE 10 MG: 5 INJECTION INTRAMUSCULAR; INTRAVENOUS at 06:56

## 2021-08-14 RX ADMIN — LATANOPROST 1 DROP: 50 SOLUTION/ DROPS OPHTHALMIC at 21:17

## 2021-08-14 RX ADMIN — HEPARIN SODIUM 5000 UNITS: 5000 INJECTION, SOLUTION INTRAVENOUS; SUBCUTANEOUS at 03:37

## 2021-08-14 RX ADMIN — POTASSIUM & SODIUM PHOSPHATES POWDER PACK 280-160-250 MG 2 PACKET: 280-160-250 PACK at 08:36

## 2021-08-14 RX ADMIN — METOCLOPRAMIDE HYDROCHLORIDE 10 MG: 5 INJECTION INTRAMUSCULAR; INTRAVENOUS at 12:09

## 2021-08-14 RX ADMIN — ACETAMINOPHEN 975 MG: 325 TABLET, FILM COATED ORAL at 06:56

## 2021-08-14 RX ADMIN — METOCLOPRAMIDE HYDROCHLORIDE 10 MG: 5 INJECTION INTRAMUSCULAR; INTRAVENOUS at 17:09

## 2021-08-14 RX ADMIN — ACETAMINOPHEN 975 MG: 325 TABLET, FILM COATED ORAL at 12:07

## 2021-08-14 RX ADMIN — PANTOPRAZOLE SODIUM 40 MG: 40 INJECTION, POWDER, FOR SOLUTION INTRAVENOUS at 08:34

## 2021-08-14 RX ADMIN — ACETAMINOPHEN 975 MG: 325 TABLET, FILM COATED ORAL at 17:08

## 2021-08-14 RX ADMIN — POTASSIUM CHLORIDE, DEXTROSE MONOHYDRATE AND SODIUM CHLORIDE: 150; 5; 450 INJECTION, SOLUTION INTRAVENOUS at 05:02

## 2021-08-14 RX ADMIN — ALVIMOPAN 12 MG: 12 CAPSULE ORAL at 21:17

## 2021-08-14 ASSESSMENT — ACTIVITIES OF DAILY LIVING (ADL)
ADLS_ACUITY_SCORE: 19

## 2021-08-14 NOTE — PLAN OF CARE
POD # 3, VSS on RA. Denies pain, epidural continues @  8ml/hour. Dermatomes intact and unchanged. Up with A1/SBA, ambulating in halls. BS hypoactive, passing gas. Urostomy patent with adequate, tea colored urine. Bilateral ABDIRIZAK drains, L>R with output. Plan to advance diet slowly.

## 2021-08-14 NOTE — PROVIDER NOTIFICATION
Paged anesthesiologist on-call to confirm OK to give subcutaneous heparin with epidural catheter in place. Confirmed that is OK to continue for now, MD will advise on holding dose prior to removal of catheter but since this is not planned today, OK to give. Georgia VeraD

## 2021-08-14 NOTE — PLAN OF CARE
POD 3.  TAHBSO, appendectomy, cysto, ileal conduit. VS stable, ex hypertensive, asymptomatic.  A&O x 4.  Denies pain. On continuous epidural  at 8 ml /hr, pain well managed. Dermatome assessment/sensations intact, and unchanged from previous shift. On clear liquids, tolerating well. Urostomy patent  w/ stents x 2, good amount of  brown/tea colored output. Bowels active,  passing flatus. Ambulated in patel x 3 this shift, tolerating activity well, although feeling more tired today. Abdominal ABDIRIZAK  drains with serosanguinous output, L>R. L ABDIRIZAK drain dressing changed due to moderate amount of serosanguinous drainage at the site.  Midline incision  WDL, and approximated with staples. On subcutaneous heparin, Anesthesia aware. Plan to  hold blood thinners when epidural ready to be removed, most likely end of the weekend per Anesthesia note.  Discharge pending TCU vs home. PT following.

## 2021-08-14 NOTE — PROGRESS NOTES
Anesthesiology Progress Note      Patient: Felicitas Victor  Surgery: Procedure(s):  ANTERIOR PELVIC EXENTERATION, BILATERAL PELVIC LYMPH NODE DISSECTION  ILEAL CONDUIT URINARY DIVERSION,  APPENDECTOMY  Surgery Date: 8/11/2021     ASA Status: 2 - Mild systemic disease  Epidural Solution: bupivacaine with fentanyl                        Rate: 8mL/hr     Subjective:   - Patient continues to do well, no acute events overnight, pain very well controlled with epidural and scheduled oral tylenol, patient able to ambulate without issue, no numbness or LE weakness, epidural infusion at 8ml/hr   - Pain score: 0/10     Objective:   Vitals: stable   Focused Physical Exam:                Neuro: 5/5 strength in upper extremities, 5/5 strength in upper extremities              Epidural Site: Clean, dry, and intact. No erythema/redness around the site                                                                   Assessment & Plan:   Felicitas Victor is a 76 year old yo female who is 2 Day Post-Op from a Procedure(s):  ANTERIOR PELVIC EXENTERATION, BILATERAL PELVIC LYMPH NODE DISSECTION  ILEAL CONDUIT URINARY DIVERSION,  APPENDECTOMY. She is being followed by Anesthesia to evaluate a continuous epidural.   - POD3, will continue epidural infusion as currently ordered and running, anticipate decreasing and removing catheter by the end of the weekend and transition to oral and IVP for breakthrough pain   - Will continue to follow  - Will need to hold anticoagulation before catheter is removed     Foreign Aviles MD       Total Time Spent With Patient: 6:46-6:55

## 2021-08-14 NOTE — PROGRESS NOTES
UROLOGY PROGRESS NOTE    August 14, 2021    SUBJECTIVE:  Feeling well today.  No pain with epidural in place.  Just got back from ambulating hallways.  Eating clear liquids for breakfast, states this is first food she has ordered.  Denies N/V.  Passing flatus, no BM yet.    Cr 0.56  Hgb 10.4  450/775cc UOP  120/240cc left ABDIRIZAK (pelvic)  5/20cc right ABDIRIAZK (subQ midline incision)    OBJECTIVE:  Temp:  [97  F (36.1  C)-97.9  F (36.6  C)] 97  F (36.1  C)  Pulse:  [83-98] 83  Resp:  [16] 16  BP: (162-181)/(63-84) 165/81  SpO2:  [92 %-94 %] 94 %    Intake/Output Summary (Last 24 hours) at 8/14/2021 0959  Last data filed at 8/14/2021 0600  Gross per 24 hour   Intake 460 ml   Output 1015 ml   Net -555 ml       GENERAL:  Awake, alert, no acute distress, up to bedside chair  HEAD: Normocephalic atraumatic  SCLERA: Anicteric  EXTREMITIES: Warm and well perfused  ABDOMEN:  Soft, appropriately tender, non-distended. No guarding, rigidity, or peritoneal signs. Both JPs serosang  INCISION:  C/d/i, staples to midline, no drainage or erythema  : Urostomy pink, stents in place, pink tinged urine output    LABS:  Lab Results   Component Value Date    WBC 7.5 08/14/2021     Lab Results   Component Value Date    HGB 10.4 08/14/2021     Lab Results   Component Value Date    HCT 33.0 08/14/2021     Lab Results   Component Value Date     08/14/2021     Last Basic Metabolic Panel:  Lab Results   Component Value Date     08/14/2021      Lab Results   Component Value Date    POTASSIUM 4.3 08/14/2021     Lab Results   Component Value Date    CHLORIDE 114 08/14/2021     Lab Results   Component Value Date    ELI 8.5 08/14/2021     Lab Results   Component Value Date    CO2 23 08/14/2021     Lab Results   Component Value Date    BUN 11 08/14/2021     Lab Results   Component Value Date    CR 0.56 08/14/2021     Lab Results   Component Value Date     08/14/2021       ASSESSMENT/PLAN: 77yo woman POD #3 s/p anterior pelvic  exenteration with ileal conduit urinary diversion with Dr. Cai-- doing well      -Neuro: cont epidural for pain (anticipate continuing until day before discharge), working well; cont scheduled tylenol   -CV: follow vitals and labs, ICDs  -Renal: cont IVF (D5 1/2 with 20K), follow outputs, phos replacement (phos normal this am)  -GI: continue entereg, reglan, protonix scheduled; continue clear liquids for now given she has not tried anything before this morning per her report; can likely increase to full liquids this evening or tomorrow AM  -: cont stents and drains, WOCN for education  -ID: cont ancef IV x 3 days   -Resp: encourage IS  -PPX: increase ambulation (at least qid); ICDs; continue sqh  -Dispo: pending.  She has some concerns about going home as she lives independently and is wondering about possible TCU placement.  Will get PT/OT for their input on home safety eval/post op deconditioning.      Leonela Acosta PA-C  [facility], a division [facility]  Office Phone: [phone]  After 4pm and on weekends, please call [phone]

## 2021-08-15 ENCOUNTER — APPOINTMENT (OUTPATIENT)
Dept: PHYSICAL THERAPY | Facility: CLINIC | Age: 76
DRG: 654 | End: 2021-08-15
Attending: PHYSICIAN ASSISTANT
Payer: MEDICARE

## 2021-08-15 ENCOUNTER — APPOINTMENT (OUTPATIENT)
Dept: OCCUPATIONAL THERAPY | Facility: CLINIC | Age: 76
DRG: 654 | End: 2021-08-15
Attending: PHYSICIAN ASSISTANT
Payer: MEDICARE

## 2021-08-15 PROCEDURE — 258N000003 HC RX IP 258 OP 636: Performed by: ANESTHESIOLOGY

## 2021-08-15 PROCEDURE — 120N000001 HC R&B MED SURG/OB

## 2021-08-15 PROCEDURE — 250N000013 HC RX MED GY IP 250 OP 250 PS 637: Performed by: PHYSICIAN ASSISTANT

## 2021-08-15 PROCEDURE — 250N000011 HC RX IP 250 OP 636: Performed by: PHYSICIAN ASSISTANT

## 2021-08-15 PROCEDURE — 250N000009 HC RX 250: Performed by: UROLOGY

## 2021-08-15 PROCEDURE — 97535 SELF CARE MNGMENT TRAINING: CPT | Mod: GO

## 2021-08-15 PROCEDURE — 250N000011 HC RX IP 250 OP 636: Performed by: HOSPITALIST

## 2021-08-15 PROCEDURE — C9113 INJ PANTOPRAZOLE SODIUM, VIA: HCPCS | Performed by: PHYSICIAN ASSISTANT

## 2021-08-15 PROCEDURE — 250N000013 HC RX MED GY IP 250 OP 250 PS 637: Performed by: HOSPITALIST

## 2021-08-15 PROCEDURE — 97530 THERAPEUTIC ACTIVITIES: CPT | Mod: GP

## 2021-08-15 PROCEDURE — 99207 PR CONSULT E&M CHANGED TO INITIAL LEVEL: CPT | Performed by: HOSPITALIST

## 2021-08-15 PROCEDURE — 250N000011 HC RX IP 250 OP 636: Performed by: ANESTHESIOLOGY

## 2021-08-15 PROCEDURE — 97116 GAIT TRAINING THERAPY: CPT | Mod: GP

## 2021-08-15 PROCEDURE — 97161 PT EVAL LOW COMPLEX 20 MIN: CPT | Mod: GP

## 2021-08-15 PROCEDURE — 97166 OT EVAL MOD COMPLEX 45 MIN: CPT | Mod: GO

## 2021-08-15 PROCEDURE — 99222 1ST HOSP IP/OBS MODERATE 55: CPT | Performed by: HOSPITALIST

## 2021-08-15 RX ORDER — SENNA AND DOCUSATE SODIUM 50; 8.6 MG/1; MG/1
1 TABLET, FILM COATED ORAL 2 TIMES DAILY PRN
Qty: 20 TABLET | Refills: 0 | Status: SHIPPED | OUTPATIENT
Start: 2021-08-15

## 2021-08-15 RX ORDER — METOPROLOL TARTRATE 1 MG/ML
5 INJECTION, SOLUTION INTRAVENOUS ONCE
Status: COMPLETED | OUTPATIENT
Start: 2021-08-15 | End: 2021-08-15

## 2021-08-15 RX ORDER — OXYCODONE HYDROCHLORIDE 5 MG/1
5 TABLET ORAL
Status: DISCONTINUED | OUTPATIENT
Start: 2021-08-15 | End: 2021-08-19 | Stop reason: HOSPADM

## 2021-08-15 RX ORDER — HYDROMORPHONE HYDROCHLORIDE 1 MG/ML
.3-.5 INJECTION, SOLUTION INTRAMUSCULAR; INTRAVENOUS; SUBCUTANEOUS
Status: DISCONTINUED | OUTPATIENT
Start: 2021-08-15 | End: 2021-08-19 | Stop reason: HOSPADM

## 2021-08-15 RX ORDER — HYDRALAZINE HYDROCHLORIDE 20 MG/ML
10 INJECTION INTRAMUSCULAR; INTRAVENOUS EVERY 4 HOURS PRN
Status: DISCONTINUED | OUTPATIENT
Start: 2021-08-15 | End: 2021-08-19 | Stop reason: HOSPADM

## 2021-08-15 RX ADMIN — METOCLOPRAMIDE HYDROCHLORIDE 10 MG: 5 INJECTION INTRAMUSCULAR; INTRAVENOUS at 23:44

## 2021-08-15 RX ADMIN — METOPROLOL TARTRATE 12.5 MG: 25 TABLET, FILM COATED ORAL at 21:23

## 2021-08-15 RX ADMIN — ACETAMINOPHEN 975 MG: 325 TABLET, FILM COATED ORAL at 00:50

## 2021-08-15 RX ADMIN — METOCLOPRAMIDE HYDROCHLORIDE 10 MG: 5 INJECTION INTRAMUSCULAR; INTRAVENOUS at 00:50

## 2021-08-15 RX ADMIN — HEPARIN SODIUM 5000 UNITS: 5000 INJECTION, SOLUTION INTRAVENOUS; SUBCUTANEOUS at 03:44

## 2021-08-15 RX ADMIN — METOCLOPRAMIDE HYDROCHLORIDE 10 MG: 5 INJECTION INTRAMUSCULAR; INTRAVENOUS at 17:49

## 2021-08-15 RX ADMIN — ACETAMINOPHEN 975 MG: 325 TABLET, FILM COATED ORAL at 13:01

## 2021-08-15 RX ADMIN — LATANOPROST 1 DROP: 50 SOLUTION/ DROPS OPHTHALMIC at 21:23

## 2021-08-15 RX ADMIN — BRIMONIDINE TARTRATE 1 DROP: 1.5 SOLUTION/ DROPS OPHTHALMIC at 09:15

## 2021-08-15 RX ADMIN — METOPROLOL TARTRATE 12.5 MG: 25 TABLET, FILM COATED ORAL at 13:02

## 2021-08-15 RX ADMIN — ACETAMINOPHEN 975 MG: 325 TABLET, FILM COATED ORAL at 23:44

## 2021-08-15 RX ADMIN — HYDRALAZINE HYDROCHLORIDE 10 MG: 20 INJECTION INTRAMUSCULAR; INTRAVENOUS at 13:11

## 2021-08-15 RX ADMIN — METOPROLOL TARTRATE 5 MG: 1 INJECTION, SOLUTION INTRAVENOUS at 10:04

## 2021-08-15 RX ADMIN — METOCLOPRAMIDE HYDROCHLORIDE 10 MG: 5 INJECTION INTRAMUSCULAR; INTRAVENOUS at 06:03

## 2021-08-15 RX ADMIN — ACETAMINOPHEN 975 MG: 325 TABLET, FILM COATED ORAL at 17:49

## 2021-08-15 RX ADMIN — ALVIMOPAN 12 MG: 12 CAPSULE ORAL at 09:12

## 2021-08-15 RX ADMIN — PANTOPRAZOLE SODIUM 40 MG: 40 INJECTION, POWDER, FOR SOLUTION INTRAVENOUS at 09:08

## 2021-08-15 RX ADMIN — ACETAMINOPHEN 975 MG: 325 TABLET, FILM COATED ORAL at 06:02

## 2021-08-15 RX ADMIN — METOCLOPRAMIDE HYDROCHLORIDE 10 MG: 5 INJECTION INTRAMUSCULAR; INTRAVENOUS at 13:05

## 2021-08-15 RX ADMIN — HEPARIN SODIUM 5000 UNITS: 5000 INJECTION, SOLUTION INTRAVENOUS; SUBCUTANEOUS at 16:54

## 2021-08-15 RX ADMIN — FENTANYL CITRATE: 0.05 INJECTION, SOLUTION INTRAMUSCULAR; INTRAVENOUS at 06:00

## 2021-08-15 ASSESSMENT — ACTIVITIES OF DAILY LIVING (ADL)
ADLS_ACUITY_SCORE: 19
PREVIOUS_RESPONSIBILITIES: MEAL PREP;HOUSEKEEPING;LAUNDRY;SHOPPING;YARDWORK;MEDICATION MANAGEMENT;FINANCES;DRIVING
ADLS_ACUITY_SCORE: 19

## 2021-08-15 ASSESSMENT — MIFFLIN-ST. JEOR: SCORE: 1327.75

## 2021-08-15 NOTE — PROVIDER NOTIFICATION
MD Notification    Notified Person: MD    Notified Person Name: Dr. Giron    Notification Date/Time: 8/15/21 at 1853    Notification Interaction: phone with     Purpose of Notification: : Convert pain medications to oral/IV, as Epidural came out unwitnessed .    Orders Received: pending call back    Comments:

## 2021-08-15 NOTE — PROGRESS NOTES
08/15/21 1400   Quick Adds   Type of Visit Initial PT Evaluation   Living Environment   People in home alone   Current Living Arrangements house   Home Accessibility stairs to enter home;stairs within home   Number of Stairs, Main Entrance 2   Stair Railings, Main Entrance railings on both sides of stairs   Number of Stairs, Within Home, Primary greater than 10 stairs   Stair Railings, Within Home, Primary none   Transportation Anticipated family or friend will provide   Living Environment Comments Per chart: Coming in from garage 2 steps to deck, 1 step up to house from there. Kitchen, bedroom and bathroom on main floor. Laundry in basement. Has neighbors close by that could assist with things as needed.    Self-Care   Usual Activity Tolerance good   Current Activity Tolerance fair   Equipment Currently Used at Home none   Activity/Exercise/Self-Care Comment Roseanna no AD.   Disability/Function   Walking or Climbing Stairs Difficulty no   Fall history within last six months no  (per chart)   Change in Functional Status Since Onset of Current Illness/Injury yes   General Information   Onset of Illness/Injury or Date of Surgery 08/11/21   Referring Physician Leonela Acosta PA-C   Patient/Family Therapy Goals Statement (PT) To DC to TCU.    Pertinent History of Current Problem (include personal factors and/or comorbidities that impact the POC) Bladder CA POD 4 TAHBSO, appendectomy, cysto, ileal conduit. HTN with post-op pain, obese.    Existing Precautions/Restrictions fall   Weight-Bearing Status - LUE full weight-bearing  (all)   Cognition   Affect/Mental Status (Cognition) WFL   Follows Commands (Cognition) WFL   Pain Assessment   Patient Currently in Pain No   Posture    Posture Comments Impaired controlled mobility   Range of Motion (ROM)   ROM Comment WFL   Strength   Strength Comments UE dependent to power to stand, limited activity tolerance.    Bed Mobility   Comment (Bed Mobility) Roseanna increased  time/effort supine to/from sitting.    Transfers   Transfer Safety Comments SIt-stands SBA no AD, UE dependence to power to stand. Pivot CGA no AD - high guard, slow, foot flat, WBOS.    Gait/Stairs (Locomotion)   Distance in Feet (Required for LE Total Joints) 150' CGA no AD unsteady self-selected gait, Douglas during DGI.    Balance   Balance Comments Unsteady without AD - DGI 14/24 (falls risk < 20), HTN, post-op. Falls risk.    Coordination   Coordination Comments WFL   Muscle Tone   Muscle Tone Comments WFL   Clinical Impression   Criteria for Skilled Therapeutic Intervention yes, treatment indicated   PT Diagnosis (PT) Impaired mobility   Influenced by the following impairments Impaired strength, balance, activity tolerance.    Functional limitations due to impairments Impaired independent mobility & living.    Clinical Presentation Stable/Uncomplicated   Clinical Decision Making (Complexity) low complexity   Therapy Frequency (PT) 4x/week   Predicted Duration of Therapy Intervention (days/wks) 3 days   Planned Therapy Interventions (PT) balance training;gait training;home exercise program;neuromuscular re-education;patient/family education;postural re-education;stair training;strengthening;transfer training;progressive activity/exercise;risk factor education   Anticipated Equipment Needs at Discharge (PT) walker, rolling   Risk & Benefits of therapy have been explained evaluation/treatment results reviewed;care plan/treatment goals reviewed;risks/benefits reviewed;patient;participants included;participants voiced agreement with care plan;current/potential barriers reviewed   PT Discharge Planning    PT Discharge Recommendation (DC Rec) Transitional Care Facility   PT Rationale for DC Rec Pt lives alone, PLOF Roseanna no AD. Pt currently needing Ax1 for mobility, unsteady without AD - TCU appropriate for safe mobility progrsesions prior to returning home alone.    PT Brief overview of current status  Bed mob Roseanna, tx  CGA, 150' no AD Douglas, 4 stairs 1 rail CGA, DGI /24 - falls risk.    Total Evaluation Time   Total Evaluation Time (Minutes) 5

## 2021-08-15 NOTE — PROGRESS NOTES
Anesthesiology note    Called to assess patient s/p anterior pelvic exenteration with ileal conduit urinary division and epidural in place for notable dripping of fluid from epidural dressing when patient was assisted to stand to go to the bathroom.  Patient is currently pain free.  Back pain free. And neurologically intact.  On examination of her back she does not have her epidural in place as the tip is visible under the bandage and there is dripping fluid.      Discussed patient with nurse.  Patient did receive heparin, but likely after epidural had become dislodged as patient states she noted fluid on her back prior to heparin administration.  At this point I think it is prudent to do neuro checks Q1-2 hours over night to assess for any neurological pain or back pain.  If any of these symptoms are noted or if patient has presence of significant back pain, should strongly consider stat MRI.    A/P S/p epidural placement that became dislodged in a patient that has been receiving heparin  Plan:  1. Convert pain medications to oral/IV per primary service recommendations  2. Neuro checks q1-2 hours x 24 hours  3. If any change in neuro status, or if patient develops significant back pain, call anesthesia and plan for stat MRI.    Discussed the plan with the patient.  She is in agreement.      Shviani De La Paz  18:

## 2021-08-15 NOTE — PLAN OF CARE
POD 4 TAHBSO, appendectomy, cysto, ileal conduit. VSS on RA. Denies pain. LS clear. BS present, continues to pass gas. Midline dressing CDI. Bilateral ABDIRIZAK drains, left has large output, right has minimal. Urostomy patent with adequate UO, dark in color. Epidural continues to infuse @ 8ml/hour. Plan to continue to slowly advance diet and monitor bowel function.

## 2021-08-15 NOTE — CONSULTS
Care Management Initial Consult    General Information  Assessment completed with: Patient, Patient  Type of CM/SW Visit: Offer D/C Planning    Primary Care Provider verified and updated as needed:     Readmission within the last 30 days:        Reason for Consult: facility placement, discharge planning  Advance Care Planning:     None on file       Communication Assessment  Patient's communication style: spoken language (English or Bilingual)    Hearing Difficulty or Deaf: no   Wear Glasses or Blind: yes    Cognitive  Cognitive/Neuro/Behavioral: WDL  Level of Consciousness: alert  Arousal Level: opens eyes spontaneously  Orientation: oriented x 4  Mood/Behavior: calm, cooperative  Best Language: 0 - No aphasia  Speech: clear, spontaneous, logical    Living Environment:   People in home: alone     Current living Arrangements: house      Able to return to prior arrangements: no  Living Arrangement Comments: TCU Recommended at discharge    Family/Social Support:  Care provided by: self  Provides care for: no one  Marital Status:   Other (specify) (Friend)          Description of Support System: Supportive    Support Assessment: Adequate social supports    Current Resources:   Patient receiving home care services:       Community Resources:    Equipment currently used at home: none  Supplies currently used at home:      Employment/Financial:  Employment Status: retired        Financial Concerns:   None indicated    Lifestyle & Psychosocial Needs:  Social Determinants of Health     Tobacco Use: Medium Risk     Smoking Tobacco Use: Former Smoker     Smokeless Tobacco Use: Never Used   Alcohol Use:      Frequency of Alcohol Consumption:      Average Number of Drinks:      Frequency of Binge Drinking:    Financial Resource Strain:      Difficulty of Paying Living Expenses:    Food Insecurity:      Worried About Running Out of Food in the Last Year:      Ran Out of Food in the Last Year:    Transportation Needs:       Lack of Transportation (Medical):      Lack of Transportation (Non-Medical):    Physical Activity:      Days of Exercise per Week:      Minutes of Exercise per Session:    Stress:      Feeling of Stress :    Social Connections:      Frequency of Communication with Friends and Family:      Frequency of Social Gatherings with Friends and Family:      Attends Worship Services:      Active Member of Clubs or Organizations:      Attends Club or Organization Meetings:      Marital Status:    Intimate Partner Violence:      Fear of Current or Ex-Partner:      Emotionally Abused:      Physically Abused:      Sexually Abused:    Depression:      PHQ-2 Score:    Housing Stability:      Unable to Pay for Housing in the Last Year:      Number of Places Lived in the Last Year:      Unstable Housing in the Last Year:        Functional Status:  Prior to admission patient needed assistance:    Patient resides at home independently.           Mental Health Status:       None indicated    Chemical Dependency Status:      None indicated          Values/Beliefs:  Spiritual, Cultural Beliefs, Worship Practices, Values that affect care:    Description of Beliefs that Will Affect Care: Cheondoism, would like a visit from             Additional Information:  TOYA met with patient to discuss recommendations for discharging to a TCU facility. Patient requesting that referrals be sent out to facilities in [facility].  TOYA sent referrals via [facility].    SIMIN Washburn

## 2021-08-15 NOTE — PROVIDER NOTIFICATION
MD Notification    Notified Person: : [facility]  Notified Person Name:  Dr. Perez ( on call provider )    Notification Date/Time: 8/15/21 at 0940    Notification Interaction: pager, phone with     Purpose of Notification: /81, HR 74. Pt reports dizziness, no headache.      Orders Received:Verbal order for IV Metoprolol 5 mg IV, one time dose. Order to place a Hospitalist Consult if medication in not effective    Comments:

## 2021-08-15 NOTE — CONSULTS
[facility]    History and Physical  Hospitalist    Felicitas Victor MRN# [id number]   Age: 76 year old YOB: 1945     Date of Admission:  8/11/2021    Primary care provider: Tamica Gordon          Assessment and Plan:     Felicitas Victor is a 76 year old  female with medical history of bladder cancer admitted to urology service on 8/11/2021 s/p anterior pelvic exenteration with ileal conduit urinary diversion, bilateral pelvic lymph node dissection, appendicectomy for postoperative care.     Postoperative elevated blood pressures with pain, hypertension undiagnosed.  Undiagnosed hypertension.  Continues to have elevated blood pressures since hospitalization,this morning SBP in 190s, received 5 mg IV metoprolol and hospitalist team consulted.  Patient complaining of pain in her abdomen. Reports typically has elevated blood pressures with systolics in the 130s to 160s.  Her doctor has been talking to her about high blood pressure, opted to monitor.   Now blood pressure is 177/77.  Will start on metoprolol 12.5 mg twice daily.  Monitor blood pressures and optimize regimen.  As needed IV hydralazine ordered.  Adequate pain control.    S/p anterior pelvic exenteration with ileal conduit urinary diversion, bilateral pelvic lymph node dissection, appendicectomy on 8/11/2021.  Defer postoperative care to urology.  Anesthesia team following, continues to be on epidural with bupivacaine with fentanyl.   Continue Protonix IV.    Postoperative anemia likely from blood loss.  Hemoglobin dropped from 13.9-10.4.  Monitor hemoglobin levels periodically.  Iron studies in AM.    Obesity with a BMI of 31.  Consider lifestyle modification as able to, consider weight loss.    COVID-19 PCR test result -8/9/2021.    DVT Prophylaxis: SCDs.  Code Status: Full code     Disposition: Expected discharge in   More than 50% of time spent in direct patient care, care coordination, patient/caregiver  counseling, and formalizing plan of care.     Total time  > 40 minutes. Discussed with patient and ED team.     Kellie Massey MD          Chief Complaint:     History is obtained from the patient     Felicitas Victor is a 76 year old  female with medical history of bladder cancer admitted to urology service on 8/11/2021 s/p anterior pelvic exenteration with ileal conduit urinary diversion, bilateral pelvic lymph node dissection, appendicectomy for postoperative care.  Urology and anesthesia team following, continues to be on epidural with bupivacaine with fentanyl.   Continues to have elevated blood pressures since hospitalization,this morning SBP in 190s, received 5 mg IV metoprolol and hospitalist team consulted.    Patient complaining of pain in her abdomen.  Reports typically has elevated blood pressures with systolics in the 130s to 160s.  Her doctor has been talking to her about high blood pressure, opted to monitor.   Denies any chest pain or shortness of breath.  Denies any dizziness at this time.  No palpitations.  No headache.  Denies any new tingling or numbness.  Denies any nausea vomiting.  Tolerating full liquid diet.         Review of Systems:     GENERAL:  no fever or chills  EENT: no difficulty swallowing, no hearing difficulty  PULMONARY: No shortness of breath  CARDIAC: no chest pain, no irregular or fast heart beats   GI: No  vomiting, diarrhea, constipation, black or bloody stools  : No burning/pain with urination  NEURO:   no loss of consciousness  ENDOCRINE: No excessive thirst  MUSCULOSKELETAL: No joint pain   SKIN: No skin rashes  PSYCHIATRY no anxiety    Medical History:     Past Medical History:   Diagnosis Date     Astigmatism with presbyopia      Bilateral low back pain      Bladder mass      Cataract, nuclear sclerotic, both eyes      Diverticulosis      Endometrial hyperplasia      Hypertension      Mild emphysema (H)      Osteopenia      Primary open angle glaucoma      Pulmonary  "nodule         Surgical History:      Past Surgical History:   Procedure Laterality Date     APPENDECTOMY OPEN N/A 8/11/2021    Procedure: APPENDECTOMY;  Surgeon: Francis Cai MD;  Location:  OR     ENT SURGERY       EXENTERATION ANTERIOR PELVIC N/A 8/11/2021    Procedure: ANTERIOR PELVIC EXENTERATION, BILATERAL PELVIC LYMPH NODE DISSECTION;  Surgeon: Francis Cai MD;  Location:  OR     GENITOURINARY SURGERY       ILEAL DIVERSION N/A 8/11/2021    Procedure: ILEAL CONDUIT URINARY DIVERSION,;  Surgeon: Francis Cai MD;  Location:  OR             Social History:      Social History     Tobacco Use     Smoking status: Former Smoker     Smokeless tobacco: Never Used   Substance Use Topics     Alcohol use: Yes     Comment: 1-2 selzer per day             Family History:   History reviewed. No pertinent family history.          Allergies:   No Known Allergies          Medications:     Medications Prior to Admission   Medication Sig Dispense Refill Last Dose     brimonidine (ALPHAGAN-P) 0.15 % ophthalmic solution Place 1 drop into both eyes every morning   8/10/2021 at am     Calcium-Magnesium-Vitamin D (CALCIUM MAGNESIUM PO) Take 3-4 tablets by mouth 2 times daily \"Bone Health Packets\"   > 1 week     carboxymethylcellulose PF (REFRESH PLUS) 0.5 % ophthalmic solution Place 1 drop into both eyes 3 times daily as needed for dry eyes   8/10/2021 at prn     Fiber POWD Take 1 Dose by mouth daily Mixed with water   > 1 week     latanoprost (XALATAN) 0.005 % ophthalmic solution Place 1 drop into both eyes every evening   8/10/2021 at pm     Multiple Vitamins-Minerals (ZINC PO) Take 1 tablet by mouth every morning \"Zinc + Holy Basil\"   > 1 week     multivitamin w/minerals (THERA-VIT-M) tablet Take 1 tablet by mouth every morning   > 1 week     Probiotic Product (PROBIOTIC PO) Take 1 Dose by mouth daily Mixed in water   > 1 week     TURMERIC PO Take 1 tablet by mouth every evening "   > 1 week             Physical Exam      Admission Weight: 78.5 kg (173 lb)  Current Weight: 84.7 kg (186 lb 11.2 oz)    Vital Signs with Ranges  Temp:  [96.2  F (35.7  C)-97.3  F (36.3  C)] 97.3  F (36.3  C)  Pulse:  [63-90] 63  Resp:  [16] 16  BP: (166-191)/(75-81) 177/77  SpO2:  [95 %-96 %] 95 %    Intake/Output Summary (Last 24 hours) at 8/15/2021 1201  Last data filed at 8/15/2021 1100  Gross per 24 hour   Intake 936 ml   Output 1680 ml   Net -744 ml       PHYSICAL EXAM  GENERAL: Patient is in no distress. Alert and oriented.  HEART: Regular rate and rhythm. S1S2. No murmurs  LUNGS: Clear to auscultation bilaterally. No expiratory wheeze.  Respirations unlabored  ABDOMEN: Soft, no abdominal tenderness, bowel sounds heard   NEURO: Cranial nerves II-XII intact. Motor and sensory system in normal range  EXTREMITIES: No pedal edema. 2+ peripheral pulses.  SKIN: Warm, dry. No rash or bruising.  PSYCHIATRY Cooperative         Data:   All new lab and imaging data was reviewed.

## 2021-08-15 NOTE — PROGRESS NOTES
Anesthesiology Progress Note      Patient: Felicitas Victor  Surgery: Procedure(s):  ANTERIOR PELVIC EXENTERATION, BILATERAL PELVIC LYMPH NODE DISSECTION  ILEAL CONDUIT URINARY DIVERSION,  APPENDECTOMY  Surgery Date: 8/11/2021     ASA Status: 2 - Mild systemic disease  Epidural Solution: 0.125% bupivacaine with 2mcg/cc fentanyl                        Rate: 8mL/hr     Subjective:   - Patient continues to do well, no acute events overnight, pain very well controlled with epidural and scheduled oral tylenol, patient able to ambulate without issue, no numbness or LE weakness, epidural infusion at 8ml/hr, clear liquid diet;  - Pain score: 0/10     Objective:   Vitals: stable   Focused Physical Exam:                Neuro: 5/5 strength in upper extremities, 5/5 strength in upper extremities              Epidural Site: Clean, dry, and intact. No erythema/redness around the site    A/P: this is POD#4 - continue with epidural today as is; typically leave epidural in for max of five days, so consider converting to oral pain meds tomorrow; please call with questions;      Dimitrios Presley MD

## 2021-08-15 NOTE — PROGRESS NOTES
"Urology POD# 4  S/P doing OK, no pain this am, epidural in,     Subjective:  Working with PT?Pt this am for possible discontinue TCU no Monday/Tuesday    This Am her BP is high     Objective:  BP (!) 173/80 (BP Location: Left arm)   Pulse 66   Temp (!) 96.2  F (35.7  C) (Oral)   Resp 16   Ht 1.651 m (5' 5\")   Wt 84.7 kg (186 lb 11.2 oz)   SpO2 95%   BMI 31.07 kg/m        Intake/Output Summary (Last 24 hours) at 8/15/2021 1021  Last data filed at 8/15/2021 0900  Gross per 24 hour   Intake 936 ml   Output 1615 ml   Net -679 ml       Labs:  ROUTINE IP LABS (Last four results)  BMP  Recent Labs   Lab 08/14/21 0717 08/13/21 0826 08/12/21  0636 08/11/21  0606    145* 141  --    POTASSIUM 4.3 3.9 4.5 4.5   CHLORIDE 114* 115* 112*  --    ELI 8.5 8.0* 8.1*  --    CO2 23 21 26  --    BUN 11 15 23  --    CR 0.56 0.66 1.10*  --    * 85 113*  --      CBC  Recent Labs   Lab 08/14/21 0717 08/13/21  0826 08/12/21  0636 08/11/21  0606   WBC 7.5 8.7 9.2  --    RBC 3.40* 3.28* 3.72*  --    HGB 10.4* 9.9* 11.2* 13.9   HCT 33.0* 32.2* 35.9  --    MCV 97 98 97  --    MCH 30.6 30.2 30.1  --    MCHC 31.5 30.7* 31.2*  --    RDW 13.5 14.1 14.0  --     215 253  --      INRNo lab results found in last 7 days.      Exam:  Gen: Alert and oriented, cooperative, NAD  Ab:+BS, soft, incision looks good, both drains in place  : ureteral Catheters in place, ostomy is pink  Ext: Calves soft, nttp, no edema      Assessment/Plan:     1.  Address her BP with medical consult today; one dose lopressor given ( 5 mg) while waiting medical input   2.  Keep epidural today and discharge it tomorrow prior to d.c ( once her discharge plans are finalized).   3.  Ambulate, advance diet       Please contact me with any questions or concerns.     Regina Perez MD  [facility]  Pager:  [phone]  Please call our office at [phone]  "

## 2021-08-15 NOTE — PROVIDER NOTIFICATION
MD Notification     Notified Person: MD     Notified Person Name: Anesthesia     Notification Date/Time:  8/15/21 at 1753     Notification Interaction: pager     Purpose of Notification: Epidural dressing pealing off, moisture noted at the site.      Orders Received: Anesthesia will come and redress the epidural dressing      Comments: Heparin was given recently ( subcutaneous)

## 2021-08-15 NOTE — PROGRESS NOTES
08/15/21 0700   Quick Adds   Type of Visit Initial Occupational Therapy Evaluation   Living Environment   People in home alone   Current Living Arrangements house   Home Accessibility stairs to enter home;stairs within home   Number of Stairs, Main Entrance 2   Stair Railings, Main Entrance railings on both sides of stairs   Number of Stairs, Within Home, Primary greater than 10 stairs   Stair Railings, Within Home, Primary none   Transportation Anticipated family or friend will provide   Living Environment Comments Coming in from garage 2 steps to deck, 1 step up to house from there. Kitchen, bedroom and bathroom on main floor. Laundry in basement. Has neighbors close by that could assist with things as needed.    Self-Care   Usual Activity Tolerance good   Current Activity Tolerance moderate   Equipment Currently Used at Home none   Activity/Exercise/Self-Care Comment Upstairs (main) shower is tub shower, walk-in shower in basement, higher toilet seats   Instrumental Activities of Daily Living (IADL)   Previous Responsibilities meal prep;housekeeping;laundry;shopping;yardwork;medication management;finances;driving   IADL Comments Prior to admission pt did not take any medcations other than eye drops, reports higher BP lately that has been monitored   Disability/Function   Hearing Difficulty or Deaf no   Wear Glasses or Blind yes   Vision Management glasses    Concentrating, Remembering or Making Decisions Difficulty no   Difficulty Communicating no   Difficulty Eating/Swallowing no   Walking or Climbing Stairs Difficulty no   Dressing/Bathing Difficulty no   Toileting issues no   Doing Errands Independently Difficulty (such as shopping) no   Fall history within last six months no   Change in Functional Status Since Onset of Current Illness/Injury no   General Information   Onset of Illness/Injury or Date of Surgery 08/11/21   Referring Physician Leonela Acosta PA-C   Patient/Family Therapy Goal  "Statement (OT) \"to feel comfortable in my own home again with all the new changes\"    Additional Occupational Profile Info/Pertinent History of Current Problem Felicitas Victor is a 76 year old female who was 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 The patient did not receive neoadjuvant chemotherapy.  Staging was done with PET-CT  and was negative for metastatic disease. POD 4 TAHBSO, appendectomy, cysto, ileal conduit.    Existing Precautions/Restrictions fall   Left Upper Extremity (Weight-bearing Status) full weight-bearing (FWB)   Right Upper Extremity (Weight-bearing Status) full weight-bearing (FWB)   Left Lower Extremity (Weight-bearing Status) full weight-bearing (FWB)   Right Lower Extremity (Weight-bearing Status) full weight-bearing (FWB)   General Observations and Info Pt reports and demonstrates elevated BP at end of session   Cognitive Status Examination   Orientation Status orientation to person, place and time   Visual Perception   Visual Impairment/Limitations WFL   Sensory   Sensory Quick Adds No deficits were identified   Sensory Comments L foot tingles at baseline d/t siatic nerve pain per pt report    Pain Assessment   Patient Currently in Pain No   Integumentary/Edema   Integumentary/Edema no deficits were identifed   Posture   Posture forward head position;protracted shoulders   Range of Motion Comprehensive   General Range of Motion no range of motion deficits identified   Strength Comprehensive (MMT)   General Manual Muscle Testing (MMT) Assessment no strength deficits identified   Muscle Tone Assessment   Muscle Tone Quick Adds No deficits were identified   Coordination   Upper Extremity Coordination No deficits were identified   Bed Mobility   Bed Mobility supine-sit   Supine-Sit [place] (Bed Mobility) supervision;minimum assist (75% patient effort)  (Assist for catheter bag mgmt, no assist for bed mobility )   Transfers   Transfers toilet transfer;sit-stand transfer "   Sit-Stand Transfer   Sit-Stand [place] (Transfers) contact guard   Assistive Device (Sit-Stand Transfers) walker, front-wheeled   Toilet Transfer   Type (Toilet Transfer) sit-stand   [place] Level (Toilet Transfer) contact guard   Assistive Device (Toilet Transfer) walker, front-wheeled;grab bars/safety frame   Clinical Impression   Criteria for Skilled Therapeutic Interventions Met (OT) yes;meets criteria;skilled treatment is necessary   OT Diagnosis Weakness s/p impairing indpendence with ADL/IADL   OT Problem List-Impairments impacting ADL problems related to;activity tolerance impaired;mobility;post-surgical precautions;strength   Assessment of Occupational Performance 3-5 Performance Deficits   Identified Performance Deficits functional mobility, home mgmt, errands    Planned Therapy Interventions (OT) ADL retraining;IADL retraining;strengthening;progressive activity/exercise;home program guidelines   Clinical Decision Making Complexity (OT) moderate complexity   Therapy Frequency (OT) 5x/week   Predicted Duration of Therapy 5 days   Risk & Benefits of therapy have been explained evaluation/treatment results reviewed;care plan/treatment goals reviewed;risks/benefits reviewed;current/potential barriers reviewed;participants voiced agreement with care plan;participants included;patient   OT Discharge Planning    OT Discharge Recommendation (DC Rec) Transitional Care Facility   OT Rationale for DC Rec OT: Pt lives alone w/o significant support system in home environment, defer to PT for mobility, at time of OT evaluation, pt ambulating w/ FWW. Pt has decreased endurance and elevated BP w/ brief activity, pt would benefit from continued skilled OT service to progress functional endurance, strength and indpendence in ADL/IADL. Pt states would feel more comfortable d/c to TCU d/t living alone and responsible for all home mgmt. If pt declines TCU or TCU declines pt, pt would require  OT to support  increased strength, would be a significant taxing effort for pt to leave home for OP OT/PT.    Total Evaluation Time (Minutes)   Total Evaluation Time (Minutes) 8

## 2021-08-16 LAB
ALBUMIN SERPL-MCNC: 2.5 G/DL (ref 3.4–5)
ALP SERPL-CCNC: 62 U/L (ref 40–150)
ALT SERPL W P-5'-P-CCNC: 15 U/L (ref 0–50)
ANION GAP SERPL CALCULATED.3IONS-SCNC: 2 MMOL/L (ref 3–14)
AST SERPL W P-5'-P-CCNC: 17 U/L (ref 0–45)
BASOPHILS # BLD AUTO: 0 10E3/UL (ref 0–0.2)
BASOPHILS NFR BLD AUTO: 0 %
BILIRUB SERPL-MCNC: 0.3 MG/DL (ref 0.2–1.3)
BUN SERPL-MCNC: 9 MG/DL (ref 7–30)
CALCIUM SERPL-MCNC: 8.5 MG/DL (ref 8.5–10.1)
CHLORIDE BLD-SCNC: 108 MMOL/L (ref 94–109)
CO2 SERPL-SCNC: 26 MMOL/L (ref 20–32)
CREAT FLD-MCNC: 0.8 MG/DL
CREAT SERPL-MCNC: 0.71 MG/DL (ref 0.52–1.04)
EOSINOPHIL # BLD AUTO: 0.1 10E3/UL (ref 0–0.7)
EOSINOPHIL NFR BLD AUTO: 1 %
ERYTHROCYTE [DISTWIDTH] IN BLOOD BY AUTOMATED COUNT: 13.2 % (ref 10–15)
GFR SERPL CREATININE-BSD FRML MDRD: 82 ML/MIN/1.73M2
GLUCOSE BLD-MCNC: 114 MG/DL (ref 70–99)
HCT VFR BLD AUTO: 33.9 % (ref 35–47)
HGB BLD-MCNC: 11 G/DL (ref 11.7–15.7)
IMM GRANULOCYTES # BLD: 0 10E3/UL
IMM GRANULOCYTES NFR BLD: 0 %
LYMPHOCYTES # BLD AUTO: 0.9 10E3/UL (ref 0.8–5.3)
LYMPHOCYTES NFR BLD AUTO: 17 %
MAGNESIUM SERPL-MCNC: 2.1 MG/DL (ref 1.6–2.3)
MCH RBC QN AUTO: 30.6 PG (ref 26.5–33)
MCHC RBC AUTO-ENTMCNC: 32.4 G/DL (ref 31.5–36.5)
MCV RBC AUTO: 94 FL (ref 78–100)
MONOCYTES # BLD AUTO: 0.5 10E3/UL (ref 0–1.3)
MONOCYTES NFR BLD AUTO: 10 %
NEUTROPHILS # BLD AUTO: 3.9 10E3/UL (ref 1.6–8.3)
NEUTROPHILS NFR BLD AUTO: 72 %
NRBC # BLD AUTO: 0 10E3/UL
NRBC BLD AUTO-RTO: 0 /100
PHOSPHATE SERPL-MCNC: 3.6 MG/DL (ref 2.5–4.5)
PLATELET # BLD AUTO: 289 10E3/UL (ref 150–450)
POTASSIUM BLD-SCNC: 4 MMOL/L (ref 3.4–5.3)
PROT SERPL-MCNC: 5.9 G/DL (ref 6.8–8.8)
RBC # BLD AUTO: 3.59 10E6/UL (ref 3.8–5.2)
SODIUM SERPL-SCNC: 136 MMOL/L (ref 133–144)
WBC # BLD AUTO: 5.3 10E3/UL (ref 4–11)

## 2021-08-16 PROCEDURE — 80053 COMPREHEN METABOLIC PANEL: CPT | Performed by: HOSPITALIST

## 2021-08-16 PROCEDURE — 250N000011 HC RX IP 250 OP 636: Performed by: PHYSICIAN ASSISTANT

## 2021-08-16 PROCEDURE — 99232 SBSQ HOSP IP/OBS MODERATE 35: CPT | Performed by: HOSPITALIST

## 2021-08-16 PROCEDURE — 82570 ASSAY OF URINE CREATININE: CPT | Performed by: PHYSICIAN ASSISTANT

## 2021-08-16 PROCEDURE — 36415 COLL VENOUS BLD VENIPUNCTURE: CPT | Performed by: HOSPITALIST

## 2021-08-16 PROCEDURE — 250N000013 HC RX MED GY IP 250 OP 250 PS 637: Performed by: PHYSICIAN ASSISTANT

## 2021-08-16 PROCEDURE — G0463 HOSPITAL OUTPT CLINIC VISIT: HCPCS

## 2021-08-16 PROCEDURE — 83735 ASSAY OF MAGNESIUM: CPT | Performed by: HOSPITALIST

## 2021-08-16 PROCEDURE — 258N000003 HC RX IP 258 OP 636: Performed by: HOSPITALIST

## 2021-08-16 PROCEDURE — 84100 ASSAY OF PHOSPHORUS: CPT | Performed by: HOSPITALIST

## 2021-08-16 PROCEDURE — 120N000001 HC R&B MED SURG/OB

## 2021-08-16 PROCEDURE — 85025 COMPLETE CBC W/AUTO DIFF WBC: CPT | Performed by: HOSPITALIST

## 2021-08-16 PROCEDURE — C9113 INJ PANTOPRAZOLE SODIUM, VIA: HCPCS | Performed by: PHYSICIAN ASSISTANT

## 2021-08-16 PROCEDURE — 250N000013 HC RX MED GY IP 250 OP 250 PS 637: Performed by: HOSPITALIST

## 2021-08-16 RX ORDER — METOPROLOL TARTRATE 25 MG/1
25 TABLET, FILM COATED ORAL 2 TIMES DAILY
Status: DISCONTINUED | OUTPATIENT
Start: 2021-08-16 | End: 2021-08-19 | Stop reason: HOSPADM

## 2021-08-16 RX ADMIN — METOPROLOL TARTRATE 25 MG: 25 TABLET, FILM COATED ORAL at 20:47

## 2021-08-16 RX ADMIN — METOCLOPRAMIDE HYDROCHLORIDE 10 MG: 5 INJECTION INTRAMUSCULAR; INTRAVENOUS at 12:05

## 2021-08-16 RX ADMIN — LATANOPROST 1 DROP: 50 SOLUTION/ DROPS OPHTHALMIC at 20:47

## 2021-08-16 RX ADMIN — PANTOPRAZOLE SODIUM 40 MG: 40 INJECTION, POWDER, FOR SOLUTION INTRAVENOUS at 08:09

## 2021-08-16 RX ADMIN — METOPROLOL TARTRATE 12.5 MG: 25 TABLET, FILM COATED ORAL at 08:09

## 2021-08-16 RX ADMIN — BRIMONIDINE TARTRATE 1 DROP: 1.5 SOLUTION/ DROPS OPHTHALMIC at 08:14

## 2021-08-16 RX ADMIN — METOCLOPRAMIDE HYDROCHLORIDE 10 MG: 5 INJECTION INTRAMUSCULAR; INTRAVENOUS at 06:26

## 2021-08-16 RX ADMIN — HEPARIN SODIUM 5000 UNITS: 5000 INJECTION, SOLUTION INTRAVENOUS; SUBCUTANEOUS at 06:26

## 2021-08-16 RX ADMIN — POTASSIUM CHLORIDE, DEXTROSE MONOHYDRATE AND SODIUM CHLORIDE: 150; 5; 450 INJECTION, SOLUTION INTRAVENOUS at 03:07

## 2021-08-16 RX ADMIN — PROCHLORPERAZINE EDISYLATE 5 MG: 5 INJECTION INTRAMUSCULAR; INTRAVENOUS at 03:14

## 2021-08-16 RX ADMIN — ACETAMINOPHEN 975 MG: 325 TABLET, FILM COATED ORAL at 06:30

## 2021-08-16 RX ADMIN — ONDANSETRON 4 MG: 2 INJECTION INTRAMUSCULAR; INTRAVENOUS at 02:12

## 2021-08-16 RX ADMIN — HEPARIN SODIUM 5000 UNITS: 5000 INJECTION, SOLUTION INTRAVENOUS; SUBCUTANEOUS at 18:05

## 2021-08-16 RX ADMIN — ACETAMINOPHEN 975 MG: 325 TABLET, FILM COATED ORAL at 12:04

## 2021-08-16 RX ADMIN — METOCLOPRAMIDE HYDROCHLORIDE 10 MG: 5 INJECTION INTRAMUSCULAR; INTRAVENOUS at 18:04

## 2021-08-16 RX ADMIN — ACETAMINOPHEN 975 MG: 325 TABLET, FILM COATED ORAL at 18:05

## 2021-08-16 ASSESSMENT — ACTIVITIES OF DAILY LIVING (ADL)
ADLS_ACUITY_SCORE: 17
ADLS_ACUITY_SCORE: 19
ADLS_ACUITY_SCORE: 19
ADLS_ACUITY_SCORE: 17
ADLS_ACUITY_SCORE: 19
ADLS_ACUITY_SCORE: 19

## 2021-08-16 ASSESSMENT — MIFFLIN-ST. JEOR: SCORE: 1335.46

## 2021-08-16 NOTE — PLAN OF CARE
"A&O x4. BP elevated, started on metoprolol 8/14 and has PRN's. POD #5 NANDA/BSO, cystectomy with ileal conduit. Good UO, pink/peach in color. Bilateral ABDIRIZAK drains, left with copious output, dressing changed x1. Right ABDIRIZAK with scan output. Up with SBA, ambulating in halls. BS present, BM x1 noted 8/14 PM. C/o intermittent nausea with 1 episode of dry heaves overnoc, PRN and scheduled meds given with relief. Denies pain, has \"discomfort\" but no PRN meds needed. Every 2 hours neuro checks, neuro's intact. Plan for continued monitoring of neuro's and discharge to TCU when stable.   "

## 2021-08-16 NOTE — PROGRESS NOTES
UROLOGY PROGRESS NOTE    August 16, 2021  Post-op Day # 5    SUBJECTIVE:  Doing well from surgical standpoint.  Having tough time today as it is 1-year anniversary of her 's death.  Epidural fell out overnight, some nausea afterward.  Tolerating full liquids.  Denies N/V today.  Passing flatus and has had several BMs.  Good UOP.  PT/OT recommending TCU at discharge.    AVSS aside from HTN  Cr 0.71  Hgb 10.4 -> 11  875/1420cc UOP  295/856cc left pelvic ABDIRIZAK  15/45cc right subQ ABDIRIZAK    OBJECTIVE:  Temp:  [96.6  F (35.9  C)-98.3  F (36.8  C)] 97.8  F (36.6  C)  Pulse:  [68-81] 81  Resp:  [16-18] 18  BP: (149-187)/(67-93) 160/77  SpO2:  [95 %-96 %] 95 %    Intake/Output Summary (Last 24 hours) at 8/16/2021 1142  Last data filed at 8/16/2021 0956  Gross per 24 hour   Intake 150 ml   Output 2260 ml   Net -2110 ml       GENERAL:  Awake, alert, no acute distress, up to chair  HEAD: Normocephalic atraumatic  SCLERA: Anicteric  EXTREMITIES: Warm and well perfused  ABDOMEN:  Soft, appropriately tender, non-distended. No guarding, rigidity, or peritoneal signs. JPs mostly serous  INCISION:  C/d/i, staples, no erythema or drainage  : Urostomy pink, stents visible, gerhard urine    LABS:  Lab Results   Component Value Date    WBC 5.3 08/16/2021     Lab Results   Component Value Date    HGB 11.0 08/16/2021     Lab Results   Component Value Date    HCT 33.9 08/16/2021     Lab Results   Component Value Date     08/16/2021     Last Basic Metabolic Panel:  Lab Results   Component Value Date     08/16/2021      Lab Results   Component Value Date    POTASSIUM 4.0 08/16/2021     Lab Results   Component Value Date    CHLORIDE 108 08/16/2021     Lab Results   Component Value Date    ELI 8.5 08/16/2021     Lab Results   Component Value Date    CO2 26 08/16/2021     Lab Results   Component Value Date    BUN 9 08/16/2021     Lab Results   Component Value Date    CR 0.71 08/16/2021     Lab Results   Component Value Date    GLC  114 08/16/2021       ASSESSMENT/PLAN: 77yo woman POD #5 s/p anterior pelvic exenteration with ileal conduit urinary diversion with Dr. Cai-- doing well      -Neuro: epidural out; scheduled tylenol; PRN oxy, dilaudid  -CV: follow vitals and labs, PCDs; HTN management per IM-- appreciate their assistance  -Renal: TKO IVF, follow outputs, send left ABDIRIZAK for ABDIRIZAK Cr though suspect just mobilizing fluids  -GI: continue reglan, protonix scheduled; advance to regular diet  -: cont stents and drains, WOCN for education  -Resp: encourage IS  -PPX: ambulation at least qid; PCDs; continue sqh, PT/OT  -Dispo: Pending, possibly tomorrow if doing well and has TCU placement (PT/OT rec TCU).  SW involved for TCU placement, referrals pending.    Addm 1615: ABDIRIZAK Cr 0.8, consistent with serum.      Leonela Acosta PA-C  [facility]  Office Phone: [phone]  After 4pm and on weekends, please call [phone]

## 2021-08-16 NOTE — PLAN OF CARE
POD 5.  TAHBSO, appendectomy, cysto, ileal conduit. VS stable, ex hypertensive 190s/80s, managed with prn metoprolol hydralazine, and scheduled metoprolol.  A&O x 4. Epidural came out this evening, pt denies pain.  On Neuro checks q 1-2 hrs per Anesthesia.  Neuro checks unchanged from previous shift.  Prn pain meds available. Dermatome assessment/sensations intact.  On full liquid die,  tolerating well. Urostomy patent  w/ stents x 2, good amount of  brown/tea colored output. Bowels active,  passing flatus, and small and medium BM. Ambulated in patel and room a few times this shift, tolerating activity well. Abdominal ABDIRIZAK  drains with serosanguinous output, L>R. R and L ABDIRIZAK dressings changed due to moderate amount of serosanguinous drainage at the site.  Midline incision  WDL, and approximated with staples. On subcutaneous heparin, anesthesia aware. Discharge pending TCU vs home.

## 2021-08-16 NOTE — PROGRESS NOTES
Notified by nursing that patient's epidural came out, anesthesiology assessed and provided recommendations. Patient is currently pain free  Discontinued fentanyl order.    Plan:  Add dilaudid IV prn  Adjusted oxycodone 5mg to q3h prn  q1-2h neuro checks for 24 hours, can remain on ST88 if nursing can provide this frequency of checks (though typically q1h are ICU only--no ICU beds, and q2h is neuro special cares)  If change in neuro status or significant back pain, notify anesthesia and order stat MRI     Edelmira Giron, DO

## 2021-08-16 NOTE — PROGRESS NOTES
"[facility] Nurse Inpatient Ostomy Assessment     Assessment of new end Ileal Conduit     Surgery date:  8/11/21   Surgeon:  Dr. Francis Cai  Procedure:anterior pelvic exenteration with ileal conduit urinary diversion    Related diagnosis:  Muscle Invasive Bladder Cancer    [facility] Assessment & Physical Exam:        Current status: Patient laying in bed. Alert and pleasant, sad- indicates it is the 1 year anniversary of the passing of her . Attentive and interactive      Date of last photo:8/16/21 8/13/21                  Stoma size: 1\" to RLQ, sits in small bowl of tissue. Os at apex    Stoma appearance: viable, moist, red with 2 ureteral stents in place.    Mucocutaneous junction:  intact    Peristomal complication(s): none     Abdominal assessment: soft    Surgical site(s): open to air and staples intact, ABDIRIZAK x2 with minimal drainage around sites    NG still in place? No    Output: gerhard,    Pain: Dull ache    Is patient still on a PCA? No      Current pouching system: 44mm Thurston 2 piece convex CTF urine pouch with ring attached to bedside drainage    Pouch last changed:  8/16  Reason for pouch change today: ostomy education and routine schedule       Learning and comprehension: Patient receptive to education. Patient visualized pouch. Return demonstration of opening and closing drain, emptying, and attaching and detaching bedside drainage. Patient removed pouching system with assist for the stents. Then indepenently cut pouch, applied barrier ring, applied skin barrier and attached pouch with cueing. Discussed diet, s/s of UTI,  Hygiene. Discussed always carrying an additional pouching system    Psychosocial: Patient lives alone, no family available. Patient has Angelia daniel visiting in the hospital. FriendTheresa, will be assisting patient at home but unable to come to the hospital and will not be there 24/7. Has a niece available who is an LPN at a " LTC facility.      WOC Plan:         Pouching product plan: .44mm [place] 2 piece convex CTF urine pouch with ring    Comments: Patient receptive to education. Performed most of pouch change independently with instruction.      Frequency of pouch changes: 3 days      Pt support system on discharge: TCU    WOC recommend home care?  yes      WOC follow-up plan: daily      Objective Data:     Patient history according to medical record: Felicitas Victor is a 76 year old female who was admitted on 8/11/2021. POD #1 s/p anterior pelvic exenteration with ileal conduit urinary diversion with Dr. Cai-- doing well      Plan:   -Neuro: cont epidural for pain, working well; IV tylenol scheduled   -CV: follow vitals and labs, ICDs  -Renal: cont IVF, follow outputs   -GI: entereg, reglan, protonix scheduled; start 8oz of clears today   -: cont stents and drains, WOCN to see; vaginal packing removed   -ID: cont ancef IV x 3 days   -Resp: encourage IS    -PPX: increase ambulation (at least qid); ICDs; start sqh tomorrow if hb remains stable       Current Diet/Nutrition:   Orders Placed This Encounter      Full Liquid Diet      Diet       Output:  I/O last 3 completed shifts:  In: 268 [P.O.:268]  Out: 2285 [Urine:1420; Drains:865]      Labs:   Recent Labs   Lab 08/16/21  0653   ALBUMIN 2.5*   HGB 11.0*   WBC 5.3         Kieran Risk Assessment:   Sensory Perception: 3-->slightly limited  Moisture: 4-->rarely moist  Activity: 3-->walks occasionally  Mobility: 3-->slightly limited  Nutrition: 2-->probably inadequate  Friction and Shear: 3-->no apparent problem  Kieran Score: 18      WOC Interventions:       Patient's chart evaluated    Focus of today's visit: pouch change return demonstration, verbal instruction , diet and hydration , pouch emptying, output measurement, odor/flatus management, lifestyle adjustments and discharge instructions     Pouch changed: 8/13    Participant(s) in teaching session today: patient  and  nurse    Change made with ostomy management today: Yes, convexity added    Supplies:44mm convex, pouches, adapt rings at bedside, ordered a few more.    Educational materials: Gave urostomy education from [facility], pt reports urostomy preop packet at home    Preparation for discharge: AVS complete.     Registered for supply samples? Patient planning TCU    Discussed plan of care with: Patient    Meli Montgomery RN, CWOCN

## 2021-08-16 NOTE — PROGRESS NOTES
[facility]  Hospitalist Progress Note   08/16/2021          Assessment and Plan:       Felicitas Victor is a 76 year old  female with medical history of bladder cancer admitted to urology service on 8/11/2021 s/p anterior pelvic exenteration with ileal conduit urinary diversion, bilateral pelvic lymph node dissection, appendicectomy for postoperative care.      Postoperative elevated blood pressures with pain, hypertension undiagnosed.  Undiagnosed hypertension.  Reports typically has elevated blood pressures with systolics in the 130s to 160s.  Her doctor has been talking to her about high blood pressure, opted to monitor.   Started on 12.5 mg metoprolol twice daily [8/15], increased to 25 mg twice daily metoprolol today with systolic blood pressures continue to be in 160s to 170s.  As needed IV hydralazine ordered.  Monitor blood pressures and optimize regimen  Adequate pain control.     S/p anterior pelvic exenteration with ileal conduit urinary diversion, bilateral pelvic lymph node dissection, appendicectomy on 8/11/2021.  Defer postoperative care to urology.  Anesthesia team following, continues to be on epidural with bupivacaine with fentanyl.   Per urology team continue Reglan and Protonix scheduled.  Wound care RN following.  Aggressive incentive spirometry.  Encourage early ambulation.  PT, OT following.  Plan for tentative TCU.     Postoperative anemia likely from blood loss.  Hemoglobin dropped from 13.9-11  Monitor hemoglobin levels periodically.     Obesity with a BMI of 31.  Consider lifestyle modification as able to, consider weight loss.     COVID-19 PCR test result -8/9/2021.     DVT Prophylaxis: SCDs.  Code Status: Full code      Disposition: Expected discharge per urology.  More than 50% of time spent in direct patient care, care coordination, patient counseling, and formalizing plan of care.  Discussed with patient, bedside RN.    Kellie Massey MD        Interval History:       Patient lying in bed.  Complaining of pain, discomfort in her abdomen.  Complaints of dry heaving earlier this morning, symptoms improving.  Epidural fell out overnight, receiving as needed pain meds.  With systolic blood pressures continue to be elevated between 160s to 170s.  Denies any chest pain or palpitation.  Denies any headache or dizziness.  No new shortness of breath.         Physical Exam:        Physical Exam   Temp:  [96.6  F (35.9  C)-98.3  F (36.8  C)] 97.8  F (36.6  C)  Pulse:  [68-81] 81  Resp:  [16-18] 18  BP: (149-184)/(76-87) 160/77  SpO2:  [95 %-96 %] 95 %    Intake/Output Summary (Last 24 hours) at 8/16/2021 1507  Last data filed at 8/16/2021 1421  Gross per 24 hour   Intake 340 ml   Output 2595 ml   Net -2255 ml       Admission Weight: 78.5 kg (173 lb)  Current Weight: 85.5 kg (188 lb 6.4 oz)    PHYSICAL EXAM  GENERAL: Patient is in no distress. Alert and oriented.  HEART: Regular rate and rhythm. S1S2. No murmurs  LUNGS: Clear to auscultation bilaterally. No expiratory wheeze.  Respirations unlabored  Abdomen bilateral ABDIRIZAK drains, dressing intact  EXTREMITIES: No pedal edema.  SKIN: Warm, dry. No rash or bruising.  PSYCHIATRY Cooperative       Medications:          acetaminophen  975 mg Oral Q6H     brimonidine  1 drop Both Eyes QAM     heparin ANTICOAGULANT  5,000 Units Subcutaneous Q12H     latanoprost  1 drop Both Eyes QPM     metoclopramide  10 mg Intravenous Q6H     metoprolol tartrate  25 mg Oral BID     pantoprazole (PROTONIX) IV  40 mg Intravenous Daily with breakfast     sodium chloride (PF)  3 mL Intracatheter Q8H     carboxymethylcellulose PF, diphenhydrAMINE **OR** diphenhydrAMINE, hydrALAZINE, HYDROmorphone, lidocaine 4%, lidocaine (buffered or not buffered), - MEDICATION INSTRUCTIONS -, nalbuphine, naloxone **OR** naloxone **OR** naloxone **OR** naloxone, ondansetron **OR** ondansetron, oxyCODONE IR, prochlorperazine **OR** prochlorperazine, sodium chloride (PF)         Data:       All new lab and imaging data was reviewed.

## 2021-08-16 NOTE — PLAN OF CARE
A/O x 4.  VSS on RA, denies pain, continue on scheduled tylenol.  Neuro checks done q 2 h, intact.  Epidural removal site on back is WDL, except for erythema/rash where adhesive was.  Up SBA, ambulated in halls x 3, had 1 BM today.  Poor appetite, denies nausea, advanced to regular diet.  ABDIRIZAK drains x 2, left is draining more than right, dressing saturated, changed x 3 - creatinine fluid sample sent. Midline incision closed with staples.   Urostomy intact, pink/yellow output.  Continue to monitor, possible discharge to TCU tomorrow pending placement and continued pain control.

## 2021-08-17 ENCOUNTER — APPOINTMENT (OUTPATIENT)
Dept: OCCUPATIONAL THERAPY | Facility: CLINIC | Age: 76
DRG: 654 | End: 2021-08-17
Attending: UROLOGY
Payer: MEDICARE

## 2021-08-17 PROCEDURE — 97530 THERAPEUTIC ACTIVITIES: CPT | Mod: GO | Performed by: OCCUPATIONAL THERAPIST

## 2021-08-17 PROCEDURE — 250N000013 HC RX MED GY IP 250 OP 250 PS 637: Performed by: PHYSICIAN ASSISTANT

## 2021-08-17 PROCEDURE — 999N000197 HC STATISTIC WOC PT EDUCATION, 0-15 MIN

## 2021-08-17 PROCEDURE — 250N000013 HC RX MED GY IP 250 OP 250 PS 637: Performed by: HOSPITALIST

## 2021-08-17 PROCEDURE — 250N000011 HC RX IP 250 OP 636: Performed by: PHYSICIAN ASSISTANT

## 2021-08-17 PROCEDURE — 120N000001 HC R&B MED SURG/OB

## 2021-08-17 PROCEDURE — 99232 SBSQ HOSP IP/OBS MODERATE 35: CPT | Performed by: HOSPITALIST

## 2021-08-17 PROCEDURE — C9113 INJ PANTOPRAZOLE SODIUM, VIA: HCPCS | Performed by: PHYSICIAN ASSISTANT

## 2021-08-17 PROCEDURE — 97535 SELF CARE MNGMENT TRAINING: CPT | Mod: GO | Performed by: OCCUPATIONAL THERAPIST

## 2021-08-17 RX ADMIN — METOCLOPRAMIDE HYDROCHLORIDE 10 MG: 5 INJECTION INTRAMUSCULAR; INTRAVENOUS at 23:30

## 2021-08-17 RX ADMIN — ACETAMINOPHEN 975 MG: 325 TABLET, FILM COATED ORAL at 13:17

## 2021-08-17 RX ADMIN — LATANOPROST 1 DROP: 50 SOLUTION/ DROPS OPHTHALMIC at 20:32

## 2021-08-17 RX ADMIN — METOCLOPRAMIDE HYDROCHLORIDE 10 MG: 5 INJECTION INTRAMUSCULAR; INTRAVENOUS at 00:48

## 2021-08-17 RX ADMIN — METOCLOPRAMIDE HYDROCHLORIDE 10 MG: 5 INJECTION INTRAMUSCULAR; INTRAVENOUS at 17:31

## 2021-08-17 RX ADMIN — METOCLOPRAMIDE HYDROCHLORIDE 10 MG: 5 INJECTION INTRAMUSCULAR; INTRAVENOUS at 06:32

## 2021-08-17 RX ADMIN — ACETAMINOPHEN 975 MG: 325 TABLET, FILM COATED ORAL at 17:29

## 2021-08-17 RX ADMIN — PANTOPRAZOLE SODIUM 40 MG: 40 INJECTION, POWDER, FOR SOLUTION INTRAVENOUS at 09:05

## 2021-08-17 RX ADMIN — BRIMONIDINE TARTRATE 1 DROP: 1.5 SOLUTION/ DROPS OPHTHALMIC at 09:00

## 2021-08-17 RX ADMIN — HEPARIN SODIUM 5000 UNITS: 5000 INJECTION, SOLUTION INTRAVENOUS; SUBCUTANEOUS at 06:32

## 2021-08-17 RX ADMIN — METOPROLOL TARTRATE 25 MG: 25 TABLET, FILM COATED ORAL at 09:05

## 2021-08-17 RX ADMIN — HEPARIN SODIUM 5000 UNITS: 5000 INJECTION, SOLUTION INTRAVENOUS; SUBCUTANEOUS at 17:23

## 2021-08-17 RX ADMIN — ACETAMINOPHEN 975 MG: 325 TABLET, FILM COATED ORAL at 06:31

## 2021-08-17 RX ADMIN — ACETAMINOPHEN 975 MG: 325 TABLET, FILM COATED ORAL at 00:48

## 2021-08-17 RX ADMIN — METOCLOPRAMIDE HYDROCHLORIDE 10 MG: 5 INJECTION INTRAMUSCULAR; INTRAVENOUS at 13:17

## 2021-08-17 RX ADMIN — METOPROLOL TARTRATE 25 MG: 25 TABLET, FILM COATED ORAL at 20:32

## 2021-08-17 ASSESSMENT — MIFFLIN-ST. JEOR: SCORE: 1293.27

## 2021-08-17 ASSESSMENT — ACTIVITIES OF DAILY LIVING (ADL)
ADLS_ACUITY_SCORE: 19

## 2021-08-17 NOTE — PROGRESS NOTES
SPIRITUAL [facility] Progress Note  [facility]    Visit with pt, per request of nurse.  (Pt had initially been seen by  and  on day of surgery, .)    Pt shared the story of her diagnosis of bladder cancer this past spring, and shared with me details about this major surgical procedure she endured.  She said she feels blessed to have been in relatively good health, and has had little experience with surgeries and health challenges.  Pt said is he recovering well, and is hopeful that she may be able to discharge to a TCU today or tomorrow.    Pt became more emotional as she talked about the death of her , one year ago yesterday.  He  at [employer] and since he was COVID positive she was unable to be with him.  It was/is clearly a complicated loss and grief.  They were  30 years (a first marriage for her and a second for him), and have no children.      Pt talked about the support she has from a community of friends, although again, due to COVID, this last year has been a challenge.      Pt's Spiritism jennifer is important to her, and has been a source of sustenance and strength through these difficult times.    Provided empathic conversation, support and prayer.   team available if pt's stay is extended.                                                                                                                                                   Chari Costello M.A.  Staff   Phone [phone]

## 2021-08-17 NOTE — PROGRESS NOTES
UROLOGY PROGRESS NOTE    August 17, 2021  Post-op Day # 6    SUBJECTIVE:  Doing well.  Pain controlled.  Didn't try regular diet yd, will eat reg diet for breakfast today.  Denies N/V.  Has been ambulating several times daily.    AVSS, HTN improved  560/1225cc UOP  185/745cc left ABDIRIZAK  60/65cc right ABDIRIZAK    OBJECTIVE:  Temp:  [97.8  F (36.6  C)-98.2  F (36.8  C)] 98.2  F (36.8  C)  Pulse:  [66-81] 66  Resp:  [16-18] 16  BP: (138-160)/(66-77) 138/66  SpO2:  [95 %-98 %] 98 %    Intake/Output Summary (Last 24 hours) at 8/17/2021 0731  Last data filed at 8/17/2021 0643  Gross per 24 hour   Intake 2162.08 ml   Output 2035 ml   Net 127.08 ml       GENERAL:  Awake, alert, no acute distress  HEAD: Normocephalic atraumatic  SCLERA: Anicteric  EXTREMITIES: Warm and well perfused  ABDOMEN:  Soft, appropriately tender, non-distended. No guarding, rigidity, or peritoneal signs. JPs serous  INCISION:  C/d/i, staples, no erythema or drainage  : Urostomy pink, stents visible, straw colored urine output    LABS:  Lab Results   Component Value Date    WBC 5.3 08/16/2021     Lab Results   Component Value Date    HGB 11.0 08/16/2021     Lab Results   Component Value Date    HCT 33.9 08/16/2021     Lab Results   Component Value Date     08/16/2021     Last Basic Metabolic Panel:  Lab Results   Component Value Date     08/16/2021      Lab Results   Component Value Date    POTASSIUM 4.0 08/16/2021     Lab Results   Component Value Date    CHLORIDE 108 08/16/2021     Lab Results   Component Value Date    ELI 8.5 08/16/2021     Lab Results   Component Value Date    CO2 26 08/16/2021     Lab Results   Component Value Date    BUN 9 08/16/2021     Lab Results   Component Value Date    CR 0.71 08/16/2021     Lab Results   Component Value Date     08/16/2021       ASSESSMENT/PLAN: 77yo woman POD #6 s/p anterior pelvic exenteration with ileal conduit urinary diversion with Dr. O'Everardo-- doing well      -Neuro: scheduled  tylenol; PRN oxy, dilaudid  -CV: follow vitals and labs, PCDs; HTN management per IM-- appreciate their assistance, improved past 24 hours  -Renal: TKO IVF, follow outputs  -GI: continue reglan, protonix scheduled; continue regular diet   -: cont stents and drains, WOCN for education  -Resp: encourage IS  -PPX: ambulation at least qid; PCDs; continue sqh, PT/OT  -Dispo: Medically stable for discharge today.  Awaiting TCU placement.  Discussed follow-up recommendations, incision cares, medications, activity restrictions, and when to call/be concerned with the patient.  Follow-up next Thursday 8/26 with Dr. Cai for staple removal, postop visit.    Dr. Cai discussed path results with pt this morning-- [id number], neg margins and neg nodes.      Leonela Acosta PA-C  [facility], a [facility]  Office Phone: [phone]  After 4pm and on weekends, please call [phone]

## 2021-08-17 NOTE — PLAN OF CARE
A/Ox4. VSS on room air. Denies pain, continued scheduled tylenol. Ambulating SBA with walker, walking halls frequently. Urostomy with good output. Passing flatus, no BM yet today. Continued IV Reglan. Left ABDIRIZAK with clear yellow output, very leaky around site - dressing frequently changed. Right ABDIRIZAK with minimal pink tinged output. Midline incision with staples - ANDREW. Awaiting TCU placement.

## 2021-08-17 NOTE — PROGRESS NOTES
WOC Focused Urostomy follow up.    Writer stopped in with patient today. Urostomy pouch is intact without leakage from 8/17 change, will not change today. Stoma pink and moist, 2 stents in place. With gerhard output. Discussed how supplies will be set up on discharge. Patient planning for TCU at this time, pending placement. Ordered a leg bag for patient to try. Will see tomorrow.    Meli Montgomery RN CWOCN

## 2021-08-17 NOTE — PROGRESS NOTES
[facility]  Hospitalist Progress Note   08/17/2021          Assessment and Plan:       Felicitas Victor is a 76 year old  female with medical history of bladder cancer admitted to urology service on 8/11/2021 s/p anterior pelvic exenteration with ileal conduit urinary diversion, bilateral pelvic lymph node dissection, appendicectomy for postoperative care.      Postoperative elevated blood pressures with pain, hypertension undiagnosed.  Undiagnosed hypertension.  Reports typically has elevated blood pressures with systolics in the 130s to 160s.  Her doctor has been talking to her about high blood pressure, opted to monitor.   Started on 12.5 mg metoprolol twice daily [8/15], increased to 25 mg twice daily metoprolol ( 8/16) with systolic blood pressures continue to be in 140S - 150S  As needed IV hydralazine ordered.  Monitor blood pressures and optimize regimen  Adequate pain control.     S/p anterior pelvic exenteration with ileal conduit urinary diversion, bilateral pelvic lymph node dissection, appendicectomy on 8/11/2021.  Defer postoperative care to urology.  Anesthesia team following, continues to be on epidural with bupivacaine with fentanyl.   Per urology team continue Reglan and Protonix scheduled.  Wound care RN following.  Aggressive incentive spirometry.  Encourage early ambulation.  PT, OT following.  Plan for tentative TCU.     Postoperative anemia likely from blood loss.  Hemoglobin dropped from 13.9-11  Monitor hemoglobin levels periodically.    Low mood situational.  Patient emotional and tearful during encounter  Reports at that time the setting of her  yesterday.  Supportive care     Obesity with a BMI of 31.  Consider lifestyle modification as able to, consider weight loss.     COVID-19 PCR test result -8/9/2021.     DVT Prophylaxis: SCDs.  Code Status: Full code      Disposition: Expected discharge per urology.  More than 50% of time spent in direct patient care, care  coordination, patient counseling, and formalizing plan of care.  Discussed with patient, bedside RN.    Kellie Massey MD        Interval History:      Patient lying in bed.  With systolic blood pressures continue to be elevated between 140s to 150s.  Denies any chest pain or palpitation.  Denies any headache or dizziness.  No new shortness of breath.         Physical Exam:        Physical Exam   Temp:  [96.3  F (35.7  C)-98.2  F (36.8  C)] 96.3  F (35.7  C)  Pulse:  [66-87] 87  Resp:  [16-20] 20  BP: (138-151)/(66-90) 151/90  SpO2:  [96 %-98 %] 96 %    Intake/Output Summary (Last 24 hours) at 8/16/2021 1507  Last data filed at 8/16/2021 1421  Gross per 24 hour   Intake 340 ml   Output 2595 ml   Net -2255 ml       Admission Weight: 78.5 kg (173 lb)  Current Weight: 85.5 kg (188 lb 6.4 oz)    PHYSICAL EXAM  GENERAL: Patient is in no distress. Alert and oriented.  HEART: Regular rate and rhythm. S1S2. No murmurs  LUNGS: Clear to auscultation bilaterally. No expiratory wheeze.  Respirations unlabored  Abdomen bilateral ABDIRIZAK drains, dressing intact  EXTREMITIES: No pedal edema.  SKIN: Warm, dry. No rash or bruising.  PSYCHIATRY Cooperative       Medications:          acetaminophen  975 mg Oral Q6H     brimonidine  1 drop Both Eyes QAM     heparin ANTICOAGULANT  5,000 Units Subcutaneous Q12H     latanoprost  1 drop Both Eyes QPM     metoclopramide  10 mg Intravenous Q6H     metoprolol tartrate  25 mg Oral BID     pantoprazole (PROTONIX) IV  40 mg Intravenous Daily with breakfast     sodium chloride (PF)  3 mL Intracatheter Q8H     carboxymethylcellulose PF, diphenhydrAMINE **OR** diphenhydrAMINE, hydrALAZINE, HYDROmorphone, lidocaine 4%, lidocaine (buffered or not buffered), - MEDICATION INSTRUCTIONS -, nalbuphine, naloxone **OR** naloxone **OR** naloxone **OR** naloxone, ondansetron **OR** ondansetron, oxyCODONE IR, prochlorperazine **OR** prochlorperazine, sodium chloride (PF)         Data:      All new lab and  imaging data was reviewed.

## 2021-08-17 NOTE — PLAN OF CARE
Pt A&Ox4. VSS. Neuros intact. Denies pain. Denies numbness or tingling. Ambulated in patel. JPx2. Left side ABDIRIZAK draining copious amounts of sanguis fluids, dressing changed on left side. Urostomy draining to maldonado bag with light pink urine. Midline incision on abdomen ANDREW, WNL.

## 2021-08-17 NOTE — PROGRESS NOTES
Care Management Follow Up    Length of Stay (days): 6    Expected Discharge Date: 08/17/2021     Concerns to be Addressed: discharge planning     Patient plan of care discussed at interdisciplinary rounds: Yes    Anticipated Discharge Disposition: Transitional Care, Skilled Nursing Facilty     Anticipated Discharge Services: None  Anticipated Discharge DME:      Patient/family educated on Medicare website which has current facility and service quality ratings: yes  Education Provided on the Discharge Plan:    Patient/Family in Agreement with the Plan: yes    Referrals Placed by CM/SW: Post Acute Facilities  Private pay costs discussed: Not applicable    Additional Information:  SW sent additional referrals out to facilities in the [facility].  Patient requesting referrals be sent to facilities as close to [place] as possible.      Addendum: [facility]  SW met with patient and she is in agreement with placement location.  Requesting a private room and is in agreement with private room fee of $49.00/day.  [place] to transport at 12:00 on 8/18 via [facility].  Patient will need new [facility] Swab - paged physician to request.     SIMIN Washburn

## 2021-08-17 NOTE — PLAN OF CARE
A/Ox4. VSS on RA. Minimal back pain managed w/ scheduled tylenol. Up SBA. Regular diet but needs encouragement. Denies nausea on scheduled reglan. ABDIRIZAK x2 w/ L side output greater than R side. Dressings CDI. Midline incision CDI. Urostomy w/ adequate output, yellow/pink in color. BS hypoactive, passing flatus, no BM overnight. PIV running IVF at 25 mL/hr. SW following for placement to TCU at discharge, referrals pending.

## 2021-08-18 LAB
ALBUMIN SERPL-MCNC: 2.5 G/DL (ref 3.4–5)
ALP SERPL-CCNC: 62 U/L (ref 40–150)
ALT SERPL W P-5'-P-CCNC: 40 U/L (ref 0–50)
ANION GAP SERPL CALCULATED.3IONS-SCNC: 5 MMOL/L (ref 3–14)
AST SERPL W P-5'-P-CCNC: 46 U/L (ref 0–45)
BASOPHILS # BLD AUTO: 0 10E3/UL (ref 0–0.2)
BASOPHILS NFR BLD AUTO: 0 %
BILIRUB SERPL-MCNC: 0.3 MG/DL (ref 0.2–1.3)
BUN SERPL-MCNC: 12 MG/DL (ref 7–30)
CALCIUM SERPL-MCNC: 8.5 MG/DL (ref 8.5–10.1)
CHLORIDE BLD-SCNC: 108 MMOL/L (ref 94–109)
CO2 SERPL-SCNC: 25 MMOL/L (ref 20–32)
CREAT SERPL-MCNC: 0.83 MG/DL (ref 0.52–1.04)
EOSINOPHIL # BLD AUTO: 0 10E3/UL (ref 0–0.7)
EOSINOPHIL NFR BLD AUTO: 0 %
ERYTHROCYTE [DISTWIDTH] IN BLOOD BY AUTOMATED COUNT: 13.6 % (ref 10–15)
GFR SERPL CREATININE-BSD FRML MDRD: 68 ML/MIN/1.73M2
GLUCOSE BLD-MCNC: 124 MG/DL (ref 70–99)
HCT VFR BLD AUTO: 34.3 % (ref 35–47)
HGB BLD-MCNC: 11.2 G/DL (ref 11.7–15.7)
HGB BLD-MCNC: 11.3 G/DL (ref 11.7–15.7)
IMM GRANULOCYTES # BLD: 0.1 10E3/UL
IMM GRANULOCYTES NFR BLD: 1 %
LYMPHOCYTES # BLD AUTO: 0.7 10E3/UL (ref 0.8–5.3)
LYMPHOCYTES NFR BLD AUTO: 8 %
MCH RBC QN AUTO: 30.7 PG (ref 26.5–33)
MCHC RBC AUTO-ENTMCNC: 32.7 G/DL (ref 31.5–36.5)
MCV RBC AUTO: 94 FL (ref 78–100)
MONOCYTES # BLD AUTO: 0.5 10E3/UL (ref 0–1.3)
MONOCYTES NFR BLD AUTO: 6 %
NEUTROPHILS # BLD AUTO: 7.1 10E3/UL (ref 1.6–8.3)
NEUTROPHILS NFR BLD AUTO: 85 %
NRBC # BLD AUTO: 0 10E3/UL
NRBC BLD AUTO-RTO: 0 /100
PHOSPHATE SERPL-MCNC: 4.2 MG/DL (ref 2.5–4.5)
PLATELET # BLD AUTO: 340 10E3/UL (ref 150–450)
POTASSIUM BLD-SCNC: 3.9 MMOL/L (ref 3.4–5.3)
PROT SERPL-MCNC: 5.8 G/DL (ref 6.8–8.8)
RBC # BLD AUTO: 3.65 10E6/UL (ref 3.8–5.2)
SARS-COV-2 RNA RESP QL NAA+PROBE: NEGATIVE
SODIUM SERPL-SCNC: 138 MMOL/L (ref 133–144)
WBC # BLD AUTO: 8.4 10E3/UL (ref 4–11)

## 2021-08-18 PROCEDURE — 82040 ASSAY OF SERUM ALBUMIN: CPT | Performed by: HOSPITALIST

## 2021-08-18 PROCEDURE — 85018 HEMOGLOBIN: CPT | Performed by: HOSPITALIST

## 2021-08-18 PROCEDURE — 87635 SARS-COV-2 COVID-19 AMP PRB: CPT | Performed by: HOSPITALIST

## 2021-08-18 PROCEDURE — 99232 SBSQ HOSP IP/OBS MODERATE 35: CPT | Performed by: HOSPITALIST

## 2021-08-18 PROCEDURE — 250N000011 HC RX IP 250 OP 636: Performed by: PHYSICIAN ASSISTANT

## 2021-08-18 PROCEDURE — 250N000013 HC RX MED GY IP 250 OP 250 PS 637: Performed by: HOSPITALIST

## 2021-08-18 PROCEDURE — 84100 ASSAY OF PHOSPHORUS: CPT | Performed by: HOSPITALIST

## 2021-08-18 PROCEDURE — 85025 COMPLETE CBC W/AUTO DIFF WBC: CPT | Performed by: HOSPITALIST

## 2021-08-18 PROCEDURE — 120N000001 HC R&B MED SURG/OB

## 2021-08-18 PROCEDURE — 250N000013 HC RX MED GY IP 250 OP 250 PS 637: Performed by: PHYSICIAN ASSISTANT

## 2021-08-18 PROCEDURE — 250N000009 HC RX 250: Performed by: PHYSICIAN ASSISTANT

## 2021-08-18 PROCEDURE — 36415 COLL VENOUS BLD VENIPUNCTURE: CPT | Performed by: HOSPITALIST

## 2021-08-18 PROCEDURE — C9113 INJ PANTOPRAZOLE SODIUM, VIA: HCPCS | Performed by: PHYSICIAN ASSISTANT

## 2021-08-18 RX ORDER — METOPROLOL TARTRATE 25 MG/1
25 TABLET, FILM COATED ORAL 2 TIMES DAILY
Refills: 0 | DISCHARGE
Start: 2021-08-18 | End: 2021-08-24

## 2021-08-18 RX ORDER — OXYCODONE HYDROCHLORIDE 5 MG/1
5 TABLET ORAL EVERY 6 HOURS PRN
Qty: 12 TABLET | Refills: 0 | Status: SHIPPED | OUTPATIENT
Start: 2021-08-18 | End: 2021-08-21

## 2021-08-18 RX ADMIN — HEPARIN SODIUM 5000 UNITS: 5000 INJECTION, SOLUTION INTRAVENOUS; SUBCUTANEOUS at 17:30

## 2021-08-18 RX ADMIN — METOCLOPRAMIDE HYDROCHLORIDE 10 MG: 5 INJECTION INTRAMUSCULAR; INTRAVENOUS at 05:41

## 2021-08-18 RX ADMIN — PROCHLORPERAZINE EDISYLATE 5 MG: 5 INJECTION INTRAMUSCULAR; INTRAVENOUS at 10:30

## 2021-08-18 RX ADMIN — BRIMONIDINE TARTRATE 1 DROP: 1.5 SOLUTION/ DROPS OPHTHALMIC at 07:37

## 2021-08-18 RX ADMIN — LATANOPROST 1 DROP: 50 SOLUTION/ DROPS OPHTHALMIC at 20:20

## 2021-08-18 RX ADMIN — LIDOCAINE HYDROCHLORIDE 30 ML: 20 SOLUTION ORAL; TOPICAL at 14:41

## 2021-08-18 RX ADMIN — HEPARIN SODIUM 5000 UNITS: 5000 INJECTION, SOLUTION INTRAVENOUS; SUBCUTANEOUS at 05:42

## 2021-08-18 RX ADMIN — ONDANSETRON 4 MG: 2 INJECTION INTRAMUSCULAR; INTRAVENOUS at 09:47

## 2021-08-18 RX ADMIN — ONDANSETRON 4 MG: 2 INJECTION INTRAMUSCULAR; INTRAVENOUS at 03:10

## 2021-08-18 RX ADMIN — METOPROLOL TARTRATE 25 MG: 25 TABLET, FILM COATED ORAL at 20:20

## 2021-08-18 RX ADMIN — ACETAMINOPHEN 975 MG: 325 TABLET, FILM COATED ORAL at 05:42

## 2021-08-18 RX ADMIN — METOCLOPRAMIDE HYDROCHLORIDE 10 MG: 5 INJECTION INTRAMUSCULAR; INTRAVENOUS at 12:07

## 2021-08-18 RX ADMIN — PANTOPRAZOLE SODIUM 40 MG: 40 INJECTION, POWDER, FOR SOLUTION INTRAVENOUS at 07:37

## 2021-08-18 RX ADMIN — ACETAMINOPHEN 975 MG: 325 TABLET, FILM COATED ORAL at 23:39

## 2021-08-18 RX ADMIN — METOCLOPRAMIDE HYDROCHLORIDE 10 MG: 5 INJECTION INTRAMUSCULAR; INTRAVENOUS at 23:39

## 2021-08-18 RX ADMIN — METOCLOPRAMIDE HYDROCHLORIDE 10 MG: 5 INJECTION INTRAMUSCULAR; INTRAVENOUS at 17:29

## 2021-08-18 ASSESSMENT — MIFFLIN-ST. JEOR: SCORE: 1282.84

## 2021-08-18 ASSESSMENT — ACTIVITIES OF DAILY LIVING (ADL)
ADLS_ACUITY_SCORE: 19

## 2021-08-18 NOTE — PROGRESS NOTES
[facility]  Hospitalist Progress Note   2021          Assessment and Plan:       Felicitas Victor is a 76 year old  female with medical history of bladder cancer admitted to urology service on 2021 s/p anterior pelvic exenteration with ileal conduit urinary diversion, bilateral pelvic lymph node dissection, appendicectomy for postoperative care.      Postoperative elevated blood pressures with pain, hypertension undiagnosed.  Undiagnosed hypertension.  Reports typically has elevated blood pressures with systolics in the 130s to 160s.  Her doctor has been talking to her about high blood pressure, opted to monitor at that time.   Started on 12.5 mg metoprolol twice daily [8/15], increased to 25 mg twice daily metoprolol ( ) with systolic blood pressures continue to be in 130s.  Monitor blood pressures and optimize regimen  Adequate pain control.     S/p anterior pelvic exenteration with ileal conduit urinary diversion, bilateral pelvic lymph node dissection, appendicectomy on 2021.  Defer postoperative care to urology.   Dry heaving, nausea and poor oral intake - will follow cbc, cmp  Aggressive incentive spirometry.  Encourage early ambulation.  PT, OT following.  Plan for  TCU.     Postoperative anemia likely from blood loss.  Hemoglobin dropped from 13.9-11  Monitor hemoglobin levels in 1 week.    Low mood situational.  Patient emotional during some encounters during hospitalization.  Unfortunately it is that time of the year where her  had  last year  Supportive care, consider behavioral therapy evaluation if ongoing low mood.     Obesity with a BMI of 31.  Consider lifestyle modification as able to, consider weight loss.     COVID-19 PCR test result -2021.     DVT Prophylaxis: SCDs.  Code Status: Full code      Disposition: Expected discharge per urology.  Discharge instructions updated.  More than 50% of time spent in direct patient care, care coordination,  patient counseling, and formalizing plan of care.  Discussed with patient, bedside RN, TOYA Massey MD        Interval History:      Patient lying in bed.  With systolic blood pressures continue to be elevated between 130s  Denies any chest pain or palpitation.  Denies any headache or dizziness.  No new shortness of breath.  Dry heaving, reports doesn't feel well reports had emesis x1.         Physical Exam:        Physical Exam   Temp:  [96.1  F (35.6  C)-96.6  F (35.9  C)] 96.6  F (35.9  C)  Pulse:  [64-87] 78  Resp:  [16-18] 16  BP: (132-157)/(51-77) 157/71  SpO2:  [95 %-97 %] 95 %    Intake/Output Summary (Last 24 hours) at 8/16/2021 1507  Last data filed at 8/16/2021 1421  Gross per 24 hour   Intake 340 ml   Output 2595 ml   Net -2255 ml       Admission Weight: 78.5 kg (173 lb)  Current Weight: 85.5 kg (188 lb 6.4 oz)    PHYSICAL EXAM  GENERAL: Patient appears frail and weak.  HEART: Regular rate and rhythm.   LUNGS: Clear to auscultation bilaterally. No expiratory wheeze.  Respirations unlabored  Abdomen dressing intact, drains present, maldonado draining +  EXTREMITIES: No pedal edema.  SKIN: Warm, dry. No rash or bruising.  PSYCHIATRY Cooperative       Medications:          acetaminophen  975 mg Oral Q6H     brimonidine  1 drop Both Eyes QAM     heparin ANTICOAGULANT  5,000 Units Subcutaneous Q12H     latanoprost  1 drop Both Eyes QPM     metoclopramide  10 mg Intravenous Q6H     metoprolol tartrate  25 mg Oral BID     pantoprazole (PROTONIX) IV  40 mg Intravenous Daily with breakfast     sodium chloride (PF)  3 mL Intracatheter Q8H     carboxymethylcellulose PF, diphenhydrAMINE **OR** diphenhydrAMINE, hydrALAZINE, HYDROmorphone, lidocaine 4%, lidocaine (buffered or not buffered), - MEDICATION INSTRUCTIONS -, nalbuphine, naloxone **OR** naloxone **OR** naloxone **OR** naloxone, ondansetron **OR** ondansetron, oxyCODONE IR, prochlorperazine **OR** prochlorperazine, sodium chloride (PF)         Data:       All new lab and imaging data was reviewed.

## 2021-08-18 NOTE — PROGRESS NOTES
"CLINICAL NUTRITION SERVICES  -  ASSESSMENT NOTE      Recommendations Ordered by Registered Dietitian (RD):   Ensure PRN as tolerated    Malnutrition:   % Weight Loss:  Weight loss does not meet criteria for malnutrition - intended   % Intake:  </= 50% for >/= 5 days (severe malnutrition)  Subcutaneous Fat Loss:  Orbital region moderate depletion  Muscle Loss:  Unable to observe   Fluid Retention:  None noted    Malnutrition Diagnosis: Unable to determine due to lack of complete nutrition focused physical exam. Suspect patient meets criteria or at least is at risk         REASON FOR ASSESSMENT  Felicitas Victor is a 76 year old female seen by Registered Dietitian for LOS      NUTRITION HISTORY  - Information obtained from patient and chart review   - Reports a regular diet at baseline and eats well at home 3x/day   - Had recently restarted weight watchers and dropped ~25 lbs intentionally this past year. She still wants to lose 20 lbs more  - No known food allergies       CURRENT NUTRITION ORDERS  Diet Order:     Regular     Current Intake/Tolerance:  Has been feeling unwell this admission and reports that no food has \"agreed\" with her. She was on a liquid diet from 8/11-8/16 and started having nausea and vomiting with solid food  She would like to try an Ensure   Day 7 inadequate nutrition     NUTRITION FOCUSED PHYSICAL ASSESSMENT FOR DIAGNOSING MALNUTRITION)  Yes - partial as patient uncomfortable          Observed:    Subcutaneous fat loss (refer to documentation in Malnutrition section)    Obtained from Chart/Interdisciplinary Team:  None noted     ANTHROPOMETRICS  Height: 5' 5\"  Weight: 176 lbs 12.8 oz  Body mass index is 29.42 kg/m .  Weight Status:  Overweight BMI 25-29.9  IBW: 56.8 kg   % IBW: 141%  Weight History: no unintended wt loss reported   Wt Readings from Last 10 Encounters:   08/18/21 80.2 kg (176 lb 12.8 oz)       LABS  Labs reviewed    MEDICATIONS  Medications reviewed      ASSESSED NUTRITION NEEDS " PER APPROVED PRACTICE GUIDELINES:    Dosing Weight 62.6 kg - adjusted  Estimated Energy Needs: 8197-1523+ kcals (25-30+ Kcal/Kg)  Justification: maintenance and overweight  Estimated Protein Needs: 75-94 grams protein (1.2-1.5 g pro/Kg)  Justification: preservation of lean body mass      MALNUTRITION:  % Weight Loss:  Weight loss does not meet criteria for malnutrition - intended   % Intake:  </= 50% for >/= 5 days (severe malnutrition)  Subcutaneous Fat Loss:  Orbital region moderate depletion  Muscle Loss:  Unable to observe   Fluid Retention:  None noted    Malnutrition Diagnosis: Unable to determine due to lack of complete nutrition focused physical exam. Suspect patient meets criteria or at least is at risk     NUTRITION DIAGNOSIS:  Inadequate oral intake related to nausea, vomiting, poor appetite as evidenced by minimal intakes x7 days this admission       NUTRITION INTERVENTIONS  Recommendations / Nutrition Prescription  Regular diet as tolerated   Will send Ensure trial       Implementation  Nutrition education: Per Provider order if indicated   Medical Food Supplement: Ensure      Nutrition Goals  Patient will consume at least 50% meals and supplements       MONITORING AND EVALUATION:  Progress towards goals will be monitored and evaluated per protocol and Practice Guidelines        Trice Grimes RD, LD  Clinical Dietitian   Unit pager [phone]

## 2021-08-18 NOTE — PLAN OF CARE
0688-8464: POD 6 TAHBSO.  A/Ox4. VSS on room air. Denies pain, continued scheduled tylenol. Ambulating SBA with walker, walked halls x 1 thsi shift.  Urostomy with good output. Educated on how to change from overnight bag to leg back.  Passing flatus. Continued IV Reglan - denies nausea. Tolerating regular diet with small meals. Left ABDIRIZAK with clear yellow output, very leaky around site - dressing changed. Right ABDIRIZAK with minimal pink tinged output. Midline incision with staples - ANDREW. Discharge pending TCU placement.

## 2021-08-18 NOTE — PROVIDER NOTIFICATION
Provider Notification    Notified Person: PA    Notified Person Name: Leonela    Notification Date/Time:  8/18/21 5641    Notification Interaction: Spoke over phone    Purpose of Notification: Continued esophageal pressure. Difficult for pt to describe discomfort. No associated SOB, chest pain or dizziness. Does not describe as nausea but had emesis x3 this AM. Symptoms with no real improvement despite prn compazine and zofran.    Orders Received: Continue with prn's for now. PA will discuss with MD and they will order any further testing or treatment as appropriate.     Comments:    ADDENDUM: PA called back at 1400. Orders to Give GI cocktail. If no improvement, let provider know. Would then need KUB and possible NG tube.     ADDENDUM: Gave PA update at 1605; pt somewhat improved with GI cocktail, no emesis since this AM. PA will enter orders for prn GI cocktail. See how evening goes; will re-evaluate need for KUB in AM pending how evening goes.

## 2021-08-18 NOTE — PROGRESS NOTES
Care Management Follow Up    Length of Stay (days): 7    Expected Discharge Date: 08/19/2021  Expected Time of Departure: 12:00 on 8/18  Concerns to be Addressed: discharge planning     Patient plan of care discussed at interdisciplinary rounds: Yes    Anticipated Discharge Disposition: Transitional Care, Skilled Nursing Facilty  Disposition Comments: [facility]  Anticipated Discharge Services: None  Anticipated Discharge DME: None    Patient/family educated on Medicare website which has current facility and service quality ratings: yes  Education Provided on the Discharge Plan:    Patient/Family in Agreement with the Plan: yes    Referrals Placed by CM/SW: Senior Linkage Line, Post Acute Facilities  Private pay costs discussed: Private Room Fee    Additional Information:  TOYA received call from Bedside nurse that patient is not ready for discharge today.  SW updated [facility] and patient is able to come to their facility on 8/19.     PAS-RR    D: Per DHS regulation, SW completed and submitted PAS-RR to [place] [employer] Direct Connect via the Senior LinkAge Line.  PAS-RR confirmation # is : [id number]    P: Further questions may be directed to [facility] Line at #[phone], option #4 for PAS-RR staff.    SIMIN Washburn

## 2021-08-18 NOTE — PROGRESS NOTES
WOC Focused Note    Attempted to see patient this morning. Patient reported it had been a rough night with nausea and dry heaves. Patient is not interested in working with WOC today. Pouching system in place and intact, has supplies at bedside for discharge. Patient has no questions at this time and has been doing well with teaching. Discharge to TCU has been post poned. Will plan to see patient tomorrow 8/19    Meli Montgomery RN CWOCN

## 2021-08-18 NOTE — PROGRESS NOTES
UROLOGY PROGRESS NOTE    August 18, 2021  Post-op Day # 7    SUBJECTIVE:  Did well yesterday, then nausea, dry heaves, burping overnight into this morning.  Not feeling well.  Has TCU bed.    AVSS, SBP to 150s at times  325/800cc UOP  180/790cc left pelvic ABDIRIZAK  0/15 right subQ ABDIRIZAK    OBJECTIVE:  Temp:  [96.1  F (35.6  C)-96.6  F (35.9  C)] 96.6  F (35.9  C)  Pulse:  [64-87] 78  Resp:  [16-18] 16  BP: (132-157)/(51-77) 157/71  SpO2:  [95 %-97 %] 95 %    Intake/Output Summary (Last 24 hours) at 8/18/2021 1011  Last data filed at 8/18/2021 0740  Gross per 24 hour   Intake 180 ml   Output 1530 ml   Net -1350 ml       GENERAL:  Awake, alert, no acute distress, resting in bed  HEAD: Normocephalic atraumatic  SCLERA: Anicteric  EXTREMITIES: Warm and well perfused  ABDOMEN:  Soft, appropriately tender, non-distended. No guarding, rigidity, or peritoneal signs. JPS serosang  INCISION:  C/d/i, staples, no erythema or drainage  : Urostomy pink, stents visible, gerhard output    LABS:  Lab Results   Component Value Date    WBC 8.4 08/18/2021     Lab Results   Component Value Date    HGB 11.2 08/18/2021     Lab Results   Component Value Date    HCT 34.3 08/18/2021     Lab Results   Component Value Date     08/18/2021     Last Basic Metabolic Panel:  Lab Results   Component Value Date     08/18/2021      Lab Results   Component Value Date    POTASSIUM 3.9 08/18/2021     Lab Results   Component Value Date    CHLORIDE 108 08/18/2021     Lab Results   Component Value Date    ELI 8.5 08/16/2021     Lab Results   Component Value Date    CO2 26 08/16/2021     Lab Results   Component Value Date    BUN 9 08/16/2021     Lab Results   Component Value Date    CR 0.71 08/16/2021     Lab Results   Component Value Date     08/16/2021       ASSESSMENT/PLAN: 77yo woman POD #7 s/p anterior pelvic exenteration with ileal conduit urinary diversion with Dr. O'Everardo-- doing well      -Neuro: scheduled tylenol; PRN oxy,  dilaudid  -CV: follow vitals and labs, PCDs; HTN management per IM-- appreciate their assistance; CBC ordered  -Renal: follow outputs; CMP ordered  -GI: continue reglan, protonix scheduled; has PRN zofran and compazine available; ice chips/sips only until dry heaves/nausea improved  -: cont stents and drains, WOCN for education  -Resp: encourage IS  -PPX: ambulation at least qid; PCDs; continue sqh, PT/OT  -Dispo: Hold on discharge today.  Possibly to TCU tomorrow if nausea/dry heaves resolved.  Follow-up next Thursday 8/26 with Dr. Cai for staple removal, postop visit.     Dr. Cai discussed path results with pt 8/17-- [id number], neg margins and neg nodes.    Addm 1400: Paged by MAURO Mejia: patient with emesis x3 this morning, ongoing esophageal discomfort but per RN, pt denies chest pain, pressure, SOB.  Discussed with Dr. Cai-- GI cocktail x1, if still having trouble will get KUB and likely place NG.  Updated Jackie.    Addm 1600: Paged by MAURO Mejia with update that patient had mild improvement with GI cocktail.  Will add this PRN for tonight.  If patient continues to improve, can ideally avoid placing NG.  If continuing to have emesis issues overnight, would plan for NG.      Eve Dietz PA-C will be rounding for our team tomorrow.    Leonela Acosta PA-C  [facility], a [facility]  Office Phone: [phone]  After 4pm and on weekends, please call [phone]

## 2021-08-18 NOTE — PLAN OF CARE
A&Ox4. VSS on RA. Denies pain. C/o nausea and emesis x2, managed with scheduled Reglan and PRN Zofran. Tolerating regular diet. Ambulating SBA. Urostomy intact with adequate UOP. L & R ABDIRIZAK drains. L with large yellow output, leaking at site, dressings changed. R with minimal serosanguinous output. Midline incision ANDREW. Plan to discharge to [place] at noon today.

## 2021-08-18 NOTE — PLAN OF CARE
A&Ox4. BP slightly elevated; unable to given scheduled metoprolol due to nausea. Did not meet parameters for prn hydralazine. Pt has difficulty describing quality of upper abdominal discomfort (see PA notification). Emesis x3 this AM. PRN zofran and compazine given with no real change. Given GI cocktail this afternoon; somewhat improved. PRN GI cocktails ordered. Plan to see how evening goes; may need KUB in AM. Discharge cancelled for today. Not passing gas yet today but abdomen soft and audible BS. Back to sips of clears, only having a few sips of water. Stood and sat at bedside today; otherwise ability to get out of bed limited due to discomfort. Urostomy patent. ABDIRIZAK's patent; left output> right. Left dressing changed due to leaking. Up with SBA. Plan pending nausea control.

## 2021-08-19 ENCOUNTER — LAB REQUISITION (OUTPATIENT)
Dept: LAB | Facility: CLINIC | Age: 76
End: 2021-08-19

## 2021-08-19 ENCOUNTER — APPOINTMENT (OUTPATIENT)
Dept: OCCUPATIONAL THERAPY | Facility: CLINIC | Age: 76
DRG: 654 | End: 2021-08-19
Attending: UROLOGY
Payer: MEDICARE

## 2021-08-19 VITALS
TEMPERATURE: 98 F | DIASTOLIC BLOOD PRESSURE: 75 MMHG | HEART RATE: 76 BPM | BODY MASS INDEX: 28.82 KG/M2 | OXYGEN SATURATION: 93 % | WEIGHT: 173 LBS | RESPIRATION RATE: 16 BRPM | HEIGHT: 65 IN | SYSTOLIC BLOOD PRESSURE: 147 MMHG

## 2021-08-19 DIAGNOSIS — Z00.01 ENCOUNTER FOR GENERAL ADULT MEDICAL EXAMINATION WITH ABNORMAL FINDINGS: ICD-10-CM

## 2021-08-19 LAB — PHOSPHATE SERPL-MCNC: 3.8 MG/DL (ref 2.5–4.5)

## 2021-08-19 PROCEDURE — U0005 INFEC AGEN DETEC AMPLI PROBE: HCPCS | Performed by: NURSE PRACTITIONER

## 2021-08-19 PROCEDURE — 99232 SBSQ HOSP IP/OBS MODERATE 35: CPT | Performed by: HOSPITALIST

## 2021-08-19 PROCEDURE — 36415 COLL VENOUS BLD VENIPUNCTURE: CPT | Performed by: HOSPITALIST

## 2021-08-19 PROCEDURE — 97530 THERAPEUTIC ACTIVITIES: CPT | Mod: GO | Performed by: OCCUPATIONAL THERAPIST

## 2021-08-19 PROCEDURE — C9113 INJ PANTOPRAZOLE SODIUM, VIA: HCPCS | Performed by: PHYSICIAN ASSISTANT

## 2021-08-19 PROCEDURE — 250N000013 HC RX MED GY IP 250 OP 250 PS 637: Performed by: HOSPITALIST

## 2021-08-19 PROCEDURE — G0463 HOSPITAL OUTPT CLINIC VISIT: HCPCS

## 2021-08-19 PROCEDURE — 250N000013 HC RX MED GY IP 250 OP 250 PS 637: Performed by: PHYSICIAN ASSISTANT

## 2021-08-19 PROCEDURE — 250N000011 HC RX IP 250 OP 636: Performed by: PHYSICIAN ASSISTANT

## 2021-08-19 PROCEDURE — 84100 ASSAY OF PHOSPHORUS: CPT | Performed by: HOSPITALIST

## 2021-08-19 PROCEDURE — 97535 SELF CARE MNGMENT TRAINING: CPT | Mod: GO | Performed by: OCCUPATIONAL THERAPIST

## 2021-08-19 RX ORDER — PANTOPRAZOLE SODIUM 40 MG/1
40 TABLET, DELAYED RELEASE ORAL DAILY
DISCHARGE
Start: 2021-08-19 | End: 2021-08-26

## 2021-08-19 RX ORDER — ONDANSETRON 4 MG/1
4 TABLET, ORALLY DISINTEGRATING ORAL EVERY 8 HOURS PRN
Refills: 0 | DISCHARGE
Start: 2021-08-19 | End: 2021-08-22

## 2021-08-19 RX ORDER — SENNOSIDES 8.6 MG
650 CAPSULE ORAL EVERY 8 HOURS PRN
Refills: 0 | DISCHARGE
Start: 2021-08-19

## 2021-08-19 RX ADMIN — METOCLOPRAMIDE HYDROCHLORIDE 10 MG: 5 INJECTION INTRAMUSCULAR; INTRAVENOUS at 05:49

## 2021-08-19 RX ADMIN — CARBOXYMETHYLCELLULOSE SODIUM 1 DROP: 5 SOLUTION/ DROPS OPHTHALMIC at 09:31

## 2021-08-19 RX ADMIN — BRIMONIDINE TARTRATE 1 DROP: 1.5 SOLUTION/ DROPS OPHTHALMIC at 09:31

## 2021-08-19 RX ADMIN — PANTOPRAZOLE SODIUM 40 MG: 40 INJECTION, POWDER, FOR SOLUTION INTRAVENOUS at 09:30

## 2021-08-19 RX ADMIN — ACETAMINOPHEN 975 MG: 325 TABLET, FILM COATED ORAL at 05:49

## 2021-08-19 RX ADMIN — HEPARIN SODIUM 5000 UNITS: 5000 INJECTION, SOLUTION INTRAVENOUS; SUBCUTANEOUS at 05:49

## 2021-08-19 RX ADMIN — METOPROLOL TARTRATE 25 MG: 25 TABLET, FILM COATED ORAL at 09:30

## 2021-08-19 ASSESSMENT — MIFFLIN-ST. JEOR: SCORE: 1265.6

## 2021-08-19 ASSESSMENT — ACTIVITIES OF DAILY LIVING (ADL)
ADLS_ACUITY_SCORE: 19

## 2021-08-19 NOTE — PROGRESS NOTES
Care Management Discharge Note    Discharge Date: 08/19/2021  Expected Time of Departure: 12:00 on 8/18    Discharge Disposition: Transitional Care, Skilled Nursing Facilty    Discharge Services: None    Discharge DME: None    Discharge Transportation: (P) other (see comments)    Private pay costs discussed: Not applicable    PAS Confirmation Code: [id number]  Patient/family educated on Medicare website which has current facility and service quality ratings: (P) yes    Education Provided on the Discharge Plan:    Persons Notified of Discharge Plans: Patient, nurse, TCU, Charge, [facility]  Patient/Family in Agreement with the Plan: (P) yes    Handoff Referral Completed: No    Additional Information:  Writer confirmed with Rosana at [place] a transport time of 1130. Writer informed nurse and charge of discharge. Writer faxed PAS and placed on chart. Writer updated [facility]. Writer informed patient that [facility] will need her Social security card, patient reported her friend will get the information around 12-1. Writer informed [facility].      Addendum: Writer faxed script to [facility]  .     Cornelia Arizmendi, NIKOW, LSW   Social Work   [facility]

## 2021-08-19 NOTE — PLAN OF CARE
Physical Therapy Discharge Summary    Reason for therapy discharge:    Discharged to transitional care facility.    Progress towards therapy goal(s). See goals on Care Plan in [facility] electronic health record for goal details.  Goals partially met.  Barriers to achieving goals:   discharge from facility.    Therapy recommendation(s):    Continued therapy is recommended.  Rationale/Recommendations:  To further increase independence with mobility.

## 2021-08-19 NOTE — PROGRESS NOTES
"[facility] Nurse Inpatient Ostomy Assessment     Assessment of new end Ileal Conduit     Surgery date:  8/11/21   Surgeon:  Dr. Francis Cai  Procedure:anterior pelvic exenteration with ileal conduit urinary diversion    Related diagnosis:  Muscle Invasive Bladder Cancer    WO Assessment & Physical Exam:      Current status: Patient laying in bed. Alert, pleasant, and interactive    Date of last photo:8/16/21 8/13/21                  Stoma size: 1\" to RLQ, sits in small bowl of tissue. Os at apex    Stoma appearance: viable, moist, red with 2 ureteral stents in place.    Mucocutaneous junction:  intact    Peristomal complication(s): none     Abdominal assessment: soft    Surgical site(s): open to air and staples intact, ABDIRIZAK x2 with moderate drainage around L ABDIRIZAK site and minimal drainage around R ABDIRIZAK site. Pt reports these are being removed before discharge.    NG still in place? No    Output: gerhard,    Pain: Dull ache    Is patient still on a PCA? No      Current pouching system: 44mm [place] 2 piece convex CTF urine pouch with ring attached to bedside drainage    Pouch last changed:  8/19  Reason for pouch change today: ostomy education and routine schedule       Learning and comprehension: Patient receptive to education. Performed entire pouch change independently without cues. Only needed assistance with stents.     Psychosocial: Patient lives alone, no family available. Patient has friendAngelia visiting in the hospital. FriendEricmercedez, will be assisting patient at home but unable to come to the hospital and will not be there 24/7. Has a niece available who is an LPN at a LTC facility.      WO Plan:         Pouching product plan: .44mm [place] 2 piece convex CTF urine pouch with ring    Comments: Patient receptive to education. Performed most of pouch change independently with instruction.      Frequency of pouch changes: 3-4 days      Pt support system " on discharge: TCU    WOC recommend home care?  yes      WOC follow-up plan: daily      Objective Data:     Patient history according to medical record: Felicitas Victor is a 76 year old female who was admitted on 8/11/2021. POD #1 s/p anterior pelvic exenteration with ileal conduit urinary diversion with Dr. Cai-- doing well      Plan:   -Neuro: cont epidural for pain, working well; IV tylenol scheduled   -CV: follow vitals and labs, ICDs  -Renal: cont IVF, follow outputs   -GI: entereg, reglan, protonix scheduled; start 8oz of clears today   -: cont stents and drains, WOCN to see; vaginal packing removed   -ID: cont ancef IV x 3 days   -Resp: encourage IS    -PPX: increase ambulation (at least qid); ICDs; start sqh tomorrow if hb remains stable       Current Diet/Nutrition:   Orders Placed This Encounter      Diet      Regular Diet Adult      Advance Diet as Tolerated       Output:  I/O last 3 completed shifts:  In: 50 [P.O.:50]  Out: 1275 [Urine:800; Drains:475]      Labs:   Recent Labs   Lab 08/18/21  0944   ALBUMIN 2.5*   HGB 11.2*   WBC 8.4         Kieran Risk Assessment:   Sensory Perception: 4-->no impairment  Moisture: 4-->rarely moist  Activity: 3-->walks occasionally  Mobility: 3-->slightly limited  Nutrition: 2-->probably inadequate  Friction and Shear: 3-->no apparent problem  Kieran Score: 19      WOC Interventions:       Patient's chart evaluated    Focus of today's visit: pouch change return demonstration, verbal instruction , pouch emptying, output measurement, lifestyle adjustments and discharge instructions     Pouch changed: 8/13    Participant(s) in teaching session today: patient  and nurse    Change made with ostomy management today: No    Supplies:44mm convex, pouches, adapt rings, leg bag at bedside    Educational materials: Gave urostomy education from [facility], pt reports urostomy preop packet at home    Preparation for discharge: AVS complete.     Registered for supply samples?  Patient planning TCU    Discussed plan of care with: Patient and Nurse    Meli Montgomery, RN, CWOCN

## 2021-08-19 NOTE — PROGRESS NOTES
[facility]  Hospitalist Progress Note   2021          Assessment and Plan:       Felicitas Victor is a 76 year old  female with medical history of bladder cancer admitted to urology service on 2021 s/p anterior pelvic exenteration with ileal conduit urinary diversion, bilateral pelvic lymph node dissection, appendicectomy for postoperative care.      Postoperative elevated blood pressures with pain, hypertension undiagnosed.  Undiagnosed hypertension.  Reports typically has elevated blood pressures with systolics in the 130s to 160s.  Her doctor has been talking to her about high blood pressure, opted to monitor at that time.   Started on 12.5 mg metoprolol twice daily [8/15], increased to 25 mg twice daily metoprolol ( ) with systolic blood pressures continue to be in 130s.  Monitor blood pressures and optimize regimen  Adequate pain control.     S/p anterior pelvic exenteration with ileal conduit urinary diversion, bilateral pelvic lymph node dissection, appendicectomy on 2021.  Defer postoperative care to urology.   Aggressive incentive spirometry.  Encourage early ambulation.  PT, OT following.  Plan for TCU.  As needed bowel meds ordered.  Tylenol as needed for mild pain ordered, reserve oxycodone for moderate to severe pain.  Minimize narcotic use as able to.  Was on IV PPI, oral pantoprazole for 1 week ordered on discharge.  Patient reports some relief of her nausea and vomiting with Zofran, as needed Zofran ordered     Postoperative anemia likely from blood loss.  Hemoglobin dropped from 13.9-11  Monitor hemoglobin levels in 1 week.    Low mood situational.  Patient emotional during some encounters during hospitalization.  Unfortunately it is that time of the year where her  had  last year  Supportive care, consider behavioral therapy evaluation if ongoing low mood.     Obesity with a BMI of 31.  Consider lifestyle modification as able to, consider weight  loss.     COVID-19 PCR test result -8/9/2021.     DVT Prophylaxis: SCDs.  Code Status: Full code      Disposition: Expected discharge per urology.  Discharge instructions updated.  Time greater than 25 minutes. more than 50% of time spent in direct patient care, care coordination, patient counseling, and formalizing plan of care.  Discussed with patient, bedside RN    Kellie Massey MD        Interval History:      Patient lying in bed.  Systolic blood pressure improving still in 140s.  Denies any chest pain or palpitation.  Denies any headache or dizziness.  No new shortness of breath.  Nausea improved.  No vomitings.         Physical Exam:        Physical Exam   Temp:  [98  F (36.7  C)-99.4  F (37.4  C)] 98  F (36.7  C)  Pulse:  [67-87] 76  Resp:  [16] 16  BP: (147-177)/(74-88) 147/75  SpO2:  [92 %-94 %] 93 %    Intake/Output Summary (Last 24 hours) at 8/16/2021 1507  Last data filed at 8/16/2021 1421  Gross per 24 hour   Intake 340 ml   Output 2595 ml   Net -2255 ml       Admission Weight: 78.5 kg (173 lb)  Current Weight: 85.5 kg (188 lb 6.4 oz)    PHYSICAL EXAM  GENERAL: Patient appears frail and weak.  HEART: Regular rate and rhythm.   LUNGS: Clear to auscultation bilaterally. No expiratory wheeze.  Respirations unlabored  Abdomen dressing intact, drains present, maldonado draining +  EXTREMITIES: No pedal edema.  SKIN: Warm, dry. No rash or bruising.  PSYCHIATRY Cooperative       Medications:          acetaminophen  975 mg Oral Q6H     brimonidine  1 drop Both Eyes QAM     heparin ANTICOAGULANT  5,000 Units Subcutaneous Q12H     latanoprost  1 drop Both Eyes QPM     metoclopramide  10 mg Intravenous Q6H     metoprolol tartrate  25 mg Oral BID     pantoprazole (PROTONIX) IV  40 mg Intravenous Daily with breakfast     sodium chloride (PF)  3 mL Intracatheter Q8H     carboxymethylcellulose PF, diphenhydrAMINE **OR** diphenhydrAMINE, hydrALAZINE, HYDROmorphone, lidocaine 4%, lidocaine viscous 2% 15 mL and  maalox/mylanta w/ simethicone 15 mL (GI COCKTAIL), lidocaine (buffered or not buffered), - MEDICATION INSTRUCTIONS -, nalbuphine, naloxone **OR** naloxone **OR** naloxone **OR** naloxone, ondansetron **OR** ondansetron, oxyCODONE IR, prochlorperazine **OR** prochlorperazine, sodium chloride (PF)         Data:      All new lab and imaging data was reviewed.

## 2021-08-19 NOTE — PLAN OF CARE
VSS. No pain or nausea this AM. Still sticking with sips of clears. Passing gas; audible BS. Had BM overnight. Urostomy patent with good output; device changed by WOC prior to discharge. ABDIRIZAK's removed, dressings applied. Went over AVS with pt. All questions answered. Eye gtts returned to pt. Scripts sent in packet with transport. Belongings sent with pt. PIV removed. Pt left unit via wheelchair with transport to discharge to [place] at 1130.

## 2021-08-19 NOTE — PROGRESS NOTES
[facility]    Urology Progress Note     Assessment & Plan   Felicitas Victor is a 76 year old female who was admitted on 8/11/2021. 77yo woman POD #8 s/p anterior pelvic exenteration with ileal conduit urinary diversion with Dr. Cai-- doing well     Plan:   Neuro: scheduled tylenol; PRN oxy, dilaudid  -CV: follow vitals and labs, PCDs; HTN management per IM-- appreciate their assistance  -Renal: follow outputs  -GI: continue reglan, protonix scheduled; ADAT  -: cont stents and drains, WOCN for education  -Resp: encourage IS  -PPX: ambulation at least qid; PCDs; continue sqh, PT/OT  -Dispo: ok for discharge to TCU today.  Follow-up next Thursday 8/26 with Dr. Cai for staple removal, postop visit.     Dr. Cai discussed path results with pt 8/17-- pT3aN0, neg margins and neg nodes.    Eve Dietz PA-C  [facility]   [email or web address]  Text Page (7:30am to 4:30pm)      Interval History   Feeling much better this AM, nausea improved  BM this AM   Ambulating well   Minimal pain   eager to discharge     AVSS  Creat 0.83  WBC 8.4  Hb 11.2    /800  ABDIRIZAK 160/460 (abdominal), 5/15 (subcutaneous)    Physical Exam   Temp: 98  F (36.7  C) Temp src: Oral BP: (!) 147/75 Pulse: 76   Resp: 16 SpO2: 93 % O2 Device: None (Room air)    Vitals:    08/17/21 0449 08/18/21 0500 08/19/21 0125   Weight: 81.2 kg (179 lb 1.6 oz) 80.2 kg (176 lb 12.8 oz) 78.5 kg (173 lb)     Vital Signs with Ranges  Temp:  [98  F (36.7  C)-99.4  F (37.4  C)] 98  F (36.7  C)  Pulse:  [67-87] 76  Resp:  [16] 16  BP: (147-177)/(74-88) 147/75  SpO2:  [92 %-94 %] 93 %  I/O last 3 completed shifts:  In: 50 [P.O.:50]  Out: 1275 [Urine:800; Drains:475]    Alert and oriented  Breathing unlabored  Abdomen soft, approp tender post op, no rebound or guarding  Incision clean, dry, intact with staples and angie   ABDIRIZAK drains with serous output   Stoma pink, stent visible, urine clear    Extremities warm and well perfused, no edema      Medications     - MEDICATION INSTRUCTIONS -         acetaminophen  975 mg Oral Q6H     brimonidine  1 drop Both Eyes QAM     heparin ANTICOAGULANT  5,000 Units Subcutaneous Q12H     latanoprost  1 drop Both Eyes QPM     metoclopramide  10 mg Intravenous Q6H     metoprolol tartrate  25 mg Oral BID     pantoprazole (PROTONIX) IV  40 mg Intravenous Daily with breakfast     sodium chloride (PF)  3 mL Intracatheter Q8H       Data   Results for orders placed or performed during the hospital encounter of 08/11/21 (from the past 24 hour(s))   Comprehensive metabolic panel   Result Value Ref Range    Sodium 138 133 - 144 mmol/L    Potassium 3.9 3.4 - 5.3 mmol/L    Chloride 108 94 - 109 mmol/L    Carbon Dioxide (CO2) 25 20 - 32 mmol/L    Anion Gap 5 3 - 14 mmol/L    Urea Nitrogen 12 7 - 30 mg/dL    Creatinine 0.83 0.52 - 1.04 mg/dL    Calcium 8.5 8.5 - 10.1 mg/dL    Glucose 124 (H) 70 - 99 mg/dL    Alkaline Phosphatase 62 40 - 150 U/L    AST 46 (H) 0 - 45 U/L    ALT 40 0 - 50 U/L    Protein Total 5.8 (L) 6.8 - 8.8 g/dL    Albumin 2.5 (L) 3.4 - 5.0 g/dL    Bilirubin Total 0.3 0.2 - 1.3 mg/dL    GFR Estimate 68 >60 mL/min/1.73m2   CBC with Platelets & Differential    Narrative    The following orders were created for panel order CBC with Platelets & Differential.  Procedure                               Abnormality         Status                     ---------                               -----------         ------                     CBC with platelets and d...[[id number]]  Abnormal            Final result                 Please view results for these tests on the individual orders.   CBC with platelets and differential   Result Value Ref Range    WBC Count 8.4 4.0 - 11.0 10e3/uL    RBC Count 3.65 (L) 3.80 - 5.20 10e6/uL    Hemoglobin 11.2 (L) 11.7 - 15.7 g/dL    Hematocrit 34.3 (L) 35.0 - 47.0 %    MCV 94 78 - 100 fL    MCH 30.7 26.5 - 33.0 pg    MCHC 32.7 31.5 - 36.5 g/dL     RDW 13.6 10.0 - 15.0 %    Platelet Count 340 150 - 450 10e3/uL    % Neutrophils 85 %    % Lymphocytes 8 %    % Monocytes 6 %    % Eosinophils 0 %    % Basophils 0 %    % Immature Granulocytes 1 %    NRBCs per 100 WBC 0 <1 /100    Absolute Neutrophils 7.1 1.6 - 8.3 10e3/uL    Absolute Lymphocytes 0.7 (L) 0.8 - 5.3 10e3/uL    Absolute Monocytes 0.5 0.0 - 1.3 10e3/uL    Absolute Eosinophils 0.0 0.0 - 0.7 10e3/uL    Absolute Basophils 0.0 0.0 - 0.2 10e3/uL    Absolute Immature Granulocytes 0.1 (H) <=0.0 10e3/uL    Absolute NRBCs 0.0 10e3/uL   Phosphorus   Result Value Ref Range    Phosphorus 4.2 2.5 - 4.5 mg/dL

## 2021-08-19 NOTE — PLAN OF CARE
A&Ox4. VSS on RA ex HTN, PRN parameters not met. Tolerating PO meds. Denies nausea overnight, scheduled Reglan given. C/o mild back discomfort d/t position, heat applied. Declines PRN GI cocktail. Urostomy with adequate UOP. ABDIRIZAK drains patent, L output greater than R. Up SBA. Discharge pending, bed still being held at [facility].

## 2021-08-19 NOTE — PROGRESS NOTES
Care Management Follow Up    Length of Stay (days): 8    Expected Discharge Date: 08/19/2021  Expected Time of Departure: 12:00 on 8/18  Concerns to be Addressed: discharge planning     Patient plan of care discussed at interdisciplinary rounds: Yes    Anticipated Discharge Disposition: Transitional Care, Skilled Nursing Facilty  Disposition Comments: [facility]  Anticipated Discharge Services: None  Anticipated Discharge DME: None    Patient/family educated on Medicare website which has current facility and service quality ratings: yes  Education Provided on the Discharge Plan:    Patient/Family in Agreement with the Plan: yes    Referrals Placed by CM/SW: [facility], Post Acute Facilities  Private pay costs discussed: Not applicable    Additional Information:  Writer saw discharge orders in. Writer faxed to [facility] and left a VM with admissions to determine what time they should plan for sky[facility] transport.     NIKO CalderonW, LSW   Social Work   [facility]

## 2021-08-20 LAB — SARS-COV-2 RNA RESP QL NAA+PROBE: NEGATIVE

## 2021-08-20 PROCEDURE — P9603 ONE-WAY ALLOW PRORATED MILES: HCPCS | Performed by: NURSE PRACTITIONER

## 2021-08-20 PROCEDURE — 86481 TB AG RESPONSE T-CELL SUSP: CPT | Performed by: NURSE PRACTITIONER

## 2021-08-20 PROCEDURE — 36415 COLL VENOUS BLD VENIPUNCTURE: CPT | Performed by: NURSE PRACTITIONER

## 2021-08-20 NOTE — DISCHARGE SUMMARY
Occupational Therapy Discharge Summary    Reason for therapy discharge:    Discharged to transitional care facility.    Progress towards therapy goal(s). See goals on Care Plan in [facility] electronic health record for goal details.  Goals partially met.  Barriers to achieving goals:   discharge from facility.    Therapy recommendation(s):    Continued therapy is recommended.  Rationale/Recommendations:  To further increase independence with functional mobility and self cares.

## 2021-08-22 LAB
QUANTIFERON MITOGEN: 0.82 IU/ML
QUANTIFERON NIL TUBE: 0.01 IU/ML
QUANTIFERON TB1 TUBE: 0 IU/ML
QUANTIFERON TB2 TUBE: 0.02

## 2021-08-23 ENCOUNTER — TRANSITIONAL CARE UNIT VISIT (OUTPATIENT)
Dept: GERIATRICS | Facility: CLINIC | Age: 76
End: 2021-08-23
Payer: MEDICARE

## 2021-08-23 VITALS
HEART RATE: 97 BPM | BODY MASS INDEX: 26.63 KG/M2 | RESPIRATION RATE: 17 BRPM | SYSTOLIC BLOOD PRESSURE: 128 MMHG | DIASTOLIC BLOOD PRESSURE: 85 MMHG | OXYGEN SATURATION: 96 % | WEIGHT: 160 LBS | TEMPERATURE: 98 F

## 2021-08-23 DIAGNOSIS — C67.9 MALIGNANT NEOPLASM OF URINARY BLADDER, UNSPECIFIED SITE (H): Primary | ICD-10-CM

## 2021-08-23 DIAGNOSIS — I10 ESSENTIAL HYPERTENSION: ICD-10-CM

## 2021-08-23 DIAGNOSIS — R53.81 PHYSICAL DECONDITIONING: ICD-10-CM

## 2021-08-23 DIAGNOSIS — D64.9 POSTOPERATIVE ANEMIA: ICD-10-CM

## 2021-08-23 DIAGNOSIS — Z71.89 ADVANCED DIRECTIVES, COUNSELING/DISCUSSION: ICD-10-CM

## 2021-08-23 LAB
GAMMA INTERFERON BACKGROUND BLD IA-ACNC: 0.01 IU/ML
M TB IFN-G BLD-IMP: NEGATIVE
M TB IFN-G CD4+ BCKGRND COR BLD-ACNC: 0.81 IU/ML
MITOGEN IGNF BCKGRD COR BLD-ACNC: -0.01 IU/ML
MITOGEN IGNF BCKGRD COR BLD-ACNC: 0.01 IU/ML

## 2021-08-23 PROCEDURE — 99310 SBSQ NF CARE HIGH MDM 45: CPT | Performed by: NURSE PRACTITIONER

## 2021-08-23 NOTE — PROGRESS NOTES
[facility] GERIATRIC SERVICES  PRIMARY CARE PROVIDER AND CLINIC:  Tamica Gordon, [facility] [address] [address]  Chief Complaint   Patient presents with     Hospital F/U      [facility] Medical Record Number:  [id number]  Place of Service where encounter took place:  [facility] [[id number]]    Felicitas Victor  is a 76 year old  (1945), admitted to the above facility from  [facility]. Hospital stay 8/11/21 through 8/19/21..   HPI:    76 year old female with medical history of bladder cancer admitted to urology service on 8/11/2021 s/p anterior pelvic exenteration with ileal conduit urinary diversion, bilateral pelvic lymph node dissection, appendicectomy for postoperative care. Due to HTN started Metoprolol. Pain managed with tylenol and oxycodone, Zofran PRN nausea. Anemia with Hgb down to 11 post op. To TCU for therapies.      Patient seen for initial TCU visit. Reports doing well, wants to discharge home on 8/25 with home care. Denies headaches, dizziness, chest pain, dyspnea, bowel issues. Urostomy patent and healing well. Incision open to air with staples. Since admission patient walks 500 ft independently. SLUMS 27/30. BP range 128-157/78-85 and sats 96% on room air. Discussed code status: DNR/DNI desired. Wants to discharge with home care orders as noted below.     CODE STATUS/ADVANCE DIRECTIVES DISCUSSION:  No CPR- Do NOT Intubate   ALLERGIES: No Known Allergies   PAST MEDICAL HISTORY:   Past Medical History:   Diagnosis Date     Astigmatism with presbyopia      Bilateral low back pain      Bladder mass      Cataract, nuclear sclerotic, both eyes      Diverticulosis      Endometrial hyperplasia      Hypertension      Mild emphysema (H)      Osteopenia      Primary open angle glaucoma      Pulmonary nodule       PAST SURGICAL HISTORY:   has a past surgical history that includes Abdomen surgery; ENT surgery; Exenteration anterior pelvic (N/A, 8/11/2021); Ileal  "diversion (N/A, 8/11/2021); and Appendectomy open (N/A, 8/11/2021).  FAMILY HISTORY: family history is not on file.  SOCIAL HISTORY:   reports that she has quit smoking. She has never used smokeless tobacco. She reports current alcohol use.  Patient's living condition: lives alone    Post Discharge Medication Reconciliation Status: discharge medications reconciled, continue medications without change  Current Outpatient Medications   Medication Sig     acetaminophen (TYLENOL) 650 MG CR tablet Take 1 tablet (650 mg) by mouth every 8 hours as needed for mild pain or fever (Patient taking differently: Take 650 mg by mouth every 4 hours as needed for mild pain or fever )     brimonidine (ALPHAGAN-P) 0.15 % ophthalmic solution Place 1 drop into both eyes every morning     carboxymethylcellulose PF (REFRESH PLUS) 0.5 % ophthalmic solution Place 1 drop into both eyes 3 times daily as needed for dry eyes     Fiber POWD Take 1 Dose by mouth daily Mixed with water     latanoprost (XALATAN) 0.005 % ophthalmic solution Place 1 drop into both eyes every evening     metoprolol tartrate (LOPRESSOR) 25 MG tablet Take 1 tablet (25 mg) by mouth 2 times daily     multivitamin w/minerals (THERA-VIT-M) tablet Take 1 tablet by mouth every morning     ondansetron (ZOFRAN) 8 MG tablet Take by mouth every 8 hours as needed for nausea     pantoprazole (PROTONIX) 40 MG EC tablet Take 1 tablet (40 mg) by mouth daily for 7 days     Probiotic Product (PROBIOTIC PO) Take 1 Dose by mouth daily Mixed in water     SENNA-docusate sodium (SENNA S) 8.6-50 MG tablet Take 1 tablet by mouth 2 times daily as needed (constipation)     Calcium-Magnesium-Vitamin D (CALCIUM MAGNESIUM PO) Take 3-4 tablets by mouth 2 times daily \"Bone Health Packets\" (Patient not taking: Reported on 8/24/2021)     Multiple Vitamins-Minerals (ZINC PO) Take 1 tablet by mouth every morning \"Zinc + Holy Basil\" (Patient not taking: Reported on 8/24/2021)     TURMERIC PO Take 1 tablet " by mouth every evening (Patient not taking: Reported on 8/24/2021)     No current facility-administered medications for this visit.       ROS:  10 point ROS of systems including Constitutional, Eyes, Respiratory, Cardiovascular, Gastroenterology, Genitourinary, Integumentary, Musculoskeletal, Psychiatric were all negative except for pertinent positives noted in my HPI.    Vitals:  /85   Pulse 97   Temp 98  F (36.7  C)   Resp 17   Wt 72.6 kg (160 lb)   SpO2 96%   BMI 26.63 kg/m    Exam:  GENERAL APPEARANCE:  Alert, in no distress, pleasant, cooperative, oriented x 4  EYES:  EOM, lids, pupils and irises normal, sclera clear and conjunctiva normal, no discharge or mattering on lids or lashes noted  ENT:  Mouth normal, moist mucous membranes, nose normal without drainage or crusting, external ears without lesions, hearing acuity intact  NECK: supple, symmetrical, trachea midline  RESP:  respiratory effort normal, no chest wall tenderness, no respiratory distress, Lung sounds clear, patient is on room air  CV:  Auscultation of heart done, rate and rhythm controlled and regular, no murmur, no rub or gallop. Edema none bilateral lower extremities.   ABDOMEN:  normal bowel sounds, soft, nontender, no palpable masses. : urostomy patent and covered with device  M/S:   Gait and station ambulates independently, no tenderness or swelling of the joints; able to move all extremities, digits normal  SKIN:  Inspection and palpation of skin and subcutaneous tissue: abdominal incision with staples and open to air, no redness or drainage  NEURO: cranial nerves 2-12 grossly intact, no facial asymmetry, no speech deficits and able to follow directions, moves all extremities symmetrically  PSYCH:  insight and judgement and memory intact, affect and mood normal     Lab/Diagnostic data:  Most Recent 3 CBC's:  Recent Labs   Lab Test 08/18/21  0944 08/18/21  0752 08/16/21  0653 08/14/21  0717   WBC 8.4  --  5.3 7.5   HGB 11.2*  11.3* 11.0* 10.4*   MCV 94  --  94 97     --  289 242     Most Recent 3 BMP's:  Recent Labs   Lab Test 08/18/21  0944 08/16/21  0653 08/14/21  0717    136 140   POTASSIUM 3.9 4.0 4.3   CHLORIDE 108 108 114*   CO2 25 26 23   BUN 12 9 11   CR 0.83 0.71 0.56   ANIONGAP 5 2* 3   ELI 8.5 8.5 8.5   * 114* 100*       ASSESSMENT/PLAN:  (C67.9) Malignant neoplasm of urinary bladder, unspecified site (H)  (primary encounter diagnosis)  Comment: acute issue, s/p surgery as described above. Continue tylenol 650 mg every 4 hrs PRN pain, Zofran PRN nausea, routine urostomy care. F/U PCP 1-2 weeks and surgeon/oncology as recommended.     (I10) Essential hypertension  Comment: recently noted.   Plan: Metoprolol 25 mg BID as PTA, monitor VS and review ranges at PCP follow up.      (D64.9) Postoperative anemia  Comment: acute onset with surgery.   Plan: HOME CARE NURSING REFERRAL. Recheck Hgb PRN at PCP follow up.     (R53.81) Physical deconditioning  Comment: acute with surgery but doing well at TCU. Home with home care as ordered below.     (Z71.89) Advanced directives, counseling/discussion  Comment: requests DNR/DNI status, POLST completed.     Orders:  DNR/DNI    Total unit/floor time of 40 minutes consisted of the following: Examination of patient, reviewing the record including pertinent labs and imaging, placing orders, and completing documentation.  More than 50% of this time (21 minutes) (>50%) was spent in coordination of care with the patient, nursing staff and other healthcare providers.   This time was spent on discussing the care plan including hospital course, hospital discharge recommendations, recent TCU course and concerns, medications, wound cares, cognitive impairment, discharge/safe placement, specialty follow up need.  Time was also spent on discussing POLST and code status.       Time with patient included: Discussion of diagnostic and imaging test results, diagnosis and prognosis, overall  goal and disposition plan.      -Medical decision making and discussions included:  Risks/benefits of tylenol for pain  Risks/benefits of discharge home this week, IDT discussion re: recommendations  Wound care details reviewed  PT/OT restrictions to include up with assist  HTN management plan to include metoprolol, VS monitoring    -Rx management plan discussion included:  Follow up needed with PCP, surgeon, oncology  Disposition and discharge plan to include likely need for home care after discharge  Medication changes to include none today  Patient education concerning POLST, expected TCU course    -Time on communication of care included:  Updated RN, RN manager, [facility] on above orders and POC  Updated SW on OK to discharge per patient wishes    Electronically signed by:  KHADIJAH Pugh CNP     Addendum:  Patient driven discharge planned for 8/25 - discharge to home with [facility] for PT, OT, RN and HHA  F/U PCP 1-2 weeks and surgeon/oncology as recommended.     Electronically signed by KHADIJAH Pugh GNP         Documentation of Face-to-Face and Certification for Home Health Services     Patient: Felicitas Victor   YOB: 1945  MR Number: [id number]  Today's Date: 8/24/2021    I certify that patient: Felicitas Victor is under my care and that I, or a nurse practitioner or physician's assistant working with me, had a face-to-face encounter that meets the physician face-to-face encounter requirements with this patient on: 8/24/21.    This encounter with the patient was in whole, or in part, for the following medical condition, which is the primary reason for home health care: weakness, bladder cancer.    I certify that, based on my findings, the following services are medically necessary home health services: Nursing, Occupational Therapy, Physical Therapy and HHA.    My clinical findings support the need for the above services because: Nurse is needed: To assess status, vs, pain after  changes in medications or other medical regimen, Occupational Therapy Services are needed to assess and treat cognitive ability and address ADL safety due to impairment in self care ability, Physical Therapy Services are needed to assess and treat the following functional impairments: weakness and debility and HHA for bathing.      Further, I certify that my clinical findings support that this patient is homebound (i.e. absences from home require considerable and taxing effort and are for medical reasons or Scientologist services or infrequently or of short duration when for other reasons) because: Requires assistance of another person or specialized equipment to access medical services because patient: Is unable to exit home safely on own due to: weakness, pain...    Based on the above findings. I certify that this patient is confined to the home and needs intermittent skilled nursing care, physical therapy and/or speech therapy.  The patient is under my care, and I have initiated the establishment of the plan of care.  This patient will be followed by a physician who will periodically review the plan of care.  Physician/Provider to provide follow up care: Tamica Gordon    Responsible Medicare certified PEC Physician: KHADIJAH Pugh CNP   Physician Signature: See electronic signature associated with these discharge orders.  Date: 8/24/2021

## 2021-08-24 DIAGNOSIS — I10 BENIGN ESSENTIAL HYPERTENSION: ICD-10-CM

## 2021-08-24 RX ORDER — ONDANSETRON 8 MG/1
TABLET, FILM COATED ORAL EVERY 8 HOURS PRN
COMMUNITY

## 2021-08-24 RX ORDER — METOPROLOL TARTRATE 25 MG/1
25 TABLET, FILM COATED ORAL 2 TIMES DAILY
Qty: 60 TABLET | Refills: 0 | Status: SHIPPED | OUTPATIENT
Start: 2021-08-24

## 2021-09-03 ENCOUNTER — DOCUMENTATION ONLY (OUTPATIENT)
Dept: OTHER | Facility: CLINIC | Age: 76
End: 2021-09-03

## (undated) DEVICE — SOL WATER IRRIG 1000ML BOTTLE 2F7114

## (undated) DEVICE — PREP CHLORAPREP 26ML TINTED ORANGE  260815

## (undated) DEVICE — SU SILK 2-0 FS-1 18" 685G

## (undated) DEVICE — SU CHROMIC 5-0 RB-1 27" U202H

## (undated) DEVICE — STPL RELOAD LINEAR CUT 75MM TCR75

## (undated) DEVICE — SU VICRYL 0 CT-1 27" J340H

## (undated) DEVICE — SU SILK 2-0 TIE 24" SA75H

## (undated) DEVICE — PREP SKIN SCRUB TRAY 4461A

## (undated) DEVICE — DRAIN JACKSON PRATT RESERVOIR 100ML SU130-1305

## (undated) DEVICE — SYR BULB IRRIG 50ML LATEX FREE 0035280

## (undated) DEVICE — OSTOMY ADHESIVE BARRIER 4X4"

## (undated) DEVICE — GLOVE PROTEXIS W/NEU-THERA 7.5  2D73TE75

## (undated) DEVICE — SU VICRYL 2-0 CT-2 27" UND J269H

## (undated) DEVICE — SU VICRYL 4-0 RB-1 27" J304

## (undated) DEVICE — SU CHROMIC 3-0 SH 27" G122H

## (undated) DEVICE — DRSG TELFA ISLAND 4X14" 7544

## (undated) DEVICE — ADAPTOR UROSTOMY DRAIN TUBE LF 7331

## (undated) DEVICE — Device

## (undated) DEVICE — CATH TRAY FOLEY SURESTEP 16FR WDRAIN BAG STLK LATEX A300316A

## (undated) DEVICE — SU ETHILON 2-0 FS 18" 664H

## (undated) DEVICE — SU PROLENE 5-0 RB-1DA 36"  8556H

## (undated) DEVICE — TUBING SUCTION 12"X1/4" N612

## (undated) DEVICE — DRAIN JACKSON PRATT 15FR ROUND SIL LF JP-2229

## (undated) DEVICE — GLOVE PROTEXIS BLUE W/NEU-THERA 8.0  2D73EB80

## (undated) DEVICE — STPL LINEAR CUT 75MM TLC75

## (undated) DEVICE — SOL NACL 0.9% IRRIG 1000ML BOTTLE 2F7124

## (undated) DEVICE — SU VICRYL 2-0 SH 27" J317H

## (undated) DEVICE — GLOVE PROTEXIS BLUE W/NEU-THERA 7.5  2D73EB75

## (undated) DEVICE — SPONGE PACK VAGINAL 2X36"

## (undated) DEVICE — SUCTION CANISTER MEDIVAC LINER 3000ML W/LID 65651-530

## (undated) DEVICE — CLIP ETHICON LIGACLIP LG YELLOW LT400

## (undated) DEVICE — SU VICRYL 3-0 SH 27" J316H

## (undated) DEVICE — SU VICRYL 2-0 UR-6 27" J602H

## (undated) DEVICE — DRAPE IOBAN INCISE 23X17" 6650EZ

## (undated) DEVICE — STPL LINEAR 90 X 3.5MM TA9035S

## (undated) DEVICE — SURGICEL POWDER ABSORBABLE HEMOSTAT 3GM 3013SP

## (undated) DEVICE — PACK AAA SBA15AAFS3

## (undated) DEVICE — SU SILK 3-0 SH CR 8X18" C013D

## (undated) DEVICE — ESU LIGASURE IMPACT OPEN SEALER/DVDR CVD LG JAW LF4418

## (undated) DEVICE — BLADE CLIPPER SGL USE 9680

## (undated) DEVICE — CLIP ETHICON LIGACLIP MED WHITE LT200

## (undated) DEVICE — SU PDS II 1 CT MONOFIL Z353H

## (undated) DEVICE — LINEN TOWEL PACK X5 5464

## (undated) DEVICE — TUBE FEEDING 08FR 90CM FTL8.0-P

## (undated) DEVICE — STPL SKIN 35W 6.9MM  PXW35

## (undated) DEVICE — SPONGE LAP 18X18" X8435

## (undated) DEVICE — SU PDS II 1 TP-1 48" Z880G

## (undated) RX ORDER — LIDOCAINE HYDROCHLORIDE 20 MG/ML
INJECTION, SOLUTION EPIDURAL; INFILTRATION; INTRACAUDAL; PERINEURAL
Status: DISPENSED
Start: 2021-08-11

## (undated) RX ORDER — FENTANYL CITRATE 50 UG/ML
INJECTION, SOLUTION INTRAMUSCULAR; INTRAVENOUS
Status: DISPENSED
Start: 2021-08-11

## (undated) RX ORDER — DEXAMETHASONE SODIUM PHOSPHATE 4 MG/ML
INJECTION, SOLUTION INTRA-ARTICULAR; INTRALESIONAL; INTRAMUSCULAR; INTRAVENOUS; SOFT TISSUE
Status: DISPENSED
Start: 2021-08-11

## (undated) RX ORDER — PROPOFOL 10 MG/ML
INJECTION, EMULSION INTRAVENOUS
Status: DISPENSED
Start: 2021-08-11

## (undated) RX ORDER — HYDROMORPHONE HYDROCHLORIDE 1 MG/ML
INJECTION, SOLUTION INTRAMUSCULAR; INTRAVENOUS; SUBCUTANEOUS
Status: DISPENSED
Start: 2021-08-11

## (undated) RX ORDER — ALBUMIN, HUMAN INJ 5% 5 %
SOLUTION INTRAVENOUS
Status: DISPENSED
Start: 2021-08-11

## (undated) RX ORDER — NEOSTIGMINE METHYLSULFATE 1 MG/ML
VIAL (ML) INJECTION
Status: DISPENSED
Start: 2021-08-11

## (undated) RX ORDER — BUPIVACAINE HYDROCHLORIDE AND EPINEPHRINE 2.5; 5 MG/ML; UG/ML
INJECTION, SOLUTION EPIDURAL; INFILTRATION; INTRACAUDAL; PERINEURAL
Status: DISPENSED
Start: 2021-08-11

## (undated) RX ORDER — CEFOXITIN 2 G/1
INJECTION, POWDER, FOR SOLUTION INTRAVENOUS
Status: DISPENSED
Start: 2021-08-11

## (undated) RX ORDER — ALVIMOPAN 12 MG/1
CAPSULE ORAL
Status: DISPENSED
Start: 2021-08-11

## (undated) RX ORDER — ONDANSETRON 2 MG/ML
INJECTION INTRAMUSCULAR; INTRAVENOUS
Status: DISPENSED
Start: 2021-08-11

## (undated) RX ORDER — GLYCOPYRROLATE 0.2 MG/ML
INJECTION, SOLUTION INTRAMUSCULAR; INTRAVENOUS
Status: DISPENSED
Start: 2021-08-11